# Patient Record
Sex: MALE | Race: BLACK OR AFRICAN AMERICAN | NOT HISPANIC OR LATINO | Employment: OTHER | URBAN - NONMETROPOLITAN AREA
[De-identification: names, ages, dates, MRNs, and addresses within clinical notes are randomized per-mention and may not be internally consistent; named-entity substitution may affect disease eponyms.]

---

## 2022-02-01 ENCOUNTER — HOSPITAL ENCOUNTER (EMERGENCY)
Facility: HOSPITAL | Age: 82
Discharge: HOME | End: 2022-02-01
Payer: COMMERCIAL

## 2022-02-01 VITALS
TEMPERATURE: 98 F | RESPIRATION RATE: 16 BRPM | HEIGHT: 71 IN | HEART RATE: 53 BPM | SYSTOLIC BLOOD PRESSURE: 130 MMHG | DIASTOLIC BLOOD PRESSURE: 70 MMHG | OXYGEN SATURATION: 96 % | WEIGHT: 195 LBS | BODY MASS INDEX: 27.3 KG/M2

## 2022-02-01 DIAGNOSIS — R10.9 FLANK PAIN: ICD-10-CM

## 2022-02-01 DIAGNOSIS — R10.31 RIGHT LOWER QUADRANT ABDOMINAL PAIN: Primary | ICD-10-CM

## 2022-02-01 LAB
ALBUMIN SERPL BCP-MCNC: 3.3 G/DL (ref 3.5–5)
ALBUMIN/GLOB SERPL: 0.8 {RATIO}
ALP SERPL-CCNC: 86 U/L (ref 45–115)
ALT SERPL W P-5'-P-CCNC: 29 U/L (ref 16–61)
ANION GAP SERPL CALCULATED.3IONS-SCNC: 12 MMOL/L (ref 7–16)
AST SERPL W P-5'-P-CCNC: 13 U/L (ref 15–37)
BASOPHILS # BLD AUTO: 0.03 K/UL (ref 0–0.2)
BASOPHILS NFR BLD AUTO: 0.5 % (ref 0–1)
BILIRUB SERPL-MCNC: 0.7 MG/DL (ref 0–1.2)
BILIRUB UR QL STRIP: NEGATIVE
BUN SERPL-MCNC: 10 MG/DL (ref 7–18)
BUN/CREAT SERPL: 9 (ref 6–20)
CALCIUM SERPL-MCNC: 9 MG/DL (ref 8.5–10.1)
CHLORIDE SERPL-SCNC: 101 MMOL/L (ref 98–107)
CLARITY UR: CLEAR
CO2 SERPL-SCNC: 28 MMOL/L (ref 21–32)
COLOR UR: YELLOW
CREAT SERPL-MCNC: 1.15 MG/DL (ref 0.7–1.3)
DIFFERENTIAL METHOD BLD: ABNORMAL
EOSINOPHIL # BLD AUTO: 0.39 K/UL (ref 0–0.5)
EOSINOPHIL NFR BLD AUTO: 6.1 % (ref 1–4)
ERYTHROCYTE [DISTWIDTH] IN BLOOD BY AUTOMATED COUNT: 13.7 % (ref 11.5–14.5)
GLOBULIN SER-MCNC: 4.2 G/DL (ref 2–4)
GLUCOSE SERPL-MCNC: 128 MG/DL (ref 74–106)
GLUCOSE UR STRIP-MCNC: NEGATIVE MG/DL
HCT VFR BLD AUTO: 38.1 % (ref 40–54)
HGB BLD-MCNC: 13.2 G/DL (ref 13.5–18)
KETONES UR STRIP-SCNC: NEGATIVE MG/DL
LEUKOCYTE ESTERASE UR QL STRIP: NEGATIVE
LYMPHOCYTES # BLD AUTO: 2.42 K/UL (ref 1–4.8)
LYMPHOCYTES NFR BLD AUTO: 37.8 % (ref 27–41)
MCH RBC QN AUTO: 28.3 PG (ref 27–31)
MCHC RBC AUTO-ENTMCNC: 34.6 G/DL (ref 32–36)
MCV RBC AUTO: 81.8 FL (ref 80–96)
MONOCYTES # BLD AUTO: 0.72 K/UL (ref 0–0.8)
MONOCYTES NFR BLD AUTO: 11.2 % (ref 2–6)
MPC BLD CALC-MCNC: 10.1 FL (ref 9.4–12.4)
NEUTROPHILS # BLD AUTO: 2.85 K/UL (ref 1.8–7.7)
NEUTROPHILS NFR BLD AUTO: 44.4 % (ref 53–65)
NITRITE UR QL STRIP: NEGATIVE
PH UR STRIP: 6 PH UNITS
PLATELET # BLD AUTO: 231 K/UL (ref 150–400)
POTASSIUM SERPL-SCNC: 4.1 MMOL/L (ref 3.5–5.1)
PROT SERPL-MCNC: 7.5 G/DL (ref 6.4–8.2)
PROT UR QL STRIP: NEGATIVE
RBC # BLD AUTO: 4.66 M/UL (ref 4.6–6.2)
RBC # UR STRIP: NEGATIVE /UL
SODIUM SERPL-SCNC: 137 MMOL/L (ref 136–145)
SP GR UR STRIP: 1.01
UROBILINOGEN UR STRIP-ACNC: 0.2 MG/DL
WBC # BLD AUTO: 6.41 K/UL (ref 4.5–11)

## 2022-02-01 PROCEDURE — 96361 HYDRATE IV INFUSION ADD-ON: CPT

## 2022-02-01 PROCEDURE — 63600175 PHARM REV CODE 636 W HCPCS: Performed by: NURSE PRACTITIONER

## 2022-02-01 PROCEDURE — 99284 EMERGENCY DEPT VISIT MOD MDM: CPT | Mod: 25

## 2022-02-01 PROCEDURE — 25000003 PHARM REV CODE 250: Performed by: NURSE PRACTITIONER

## 2022-02-01 PROCEDURE — 96375 TX/PRO/DX INJ NEW DRUG ADDON: CPT

## 2022-02-01 PROCEDURE — 99284 PR EMERGENCY DEPT VISIT,LEVEL IV: ICD-10-PCS | Mod: ,,, | Performed by: NURSE PRACTITIONER

## 2022-02-01 PROCEDURE — 99284 EMERGENCY DEPT VISIT MOD MDM: CPT | Mod: ,,, | Performed by: NURSE PRACTITIONER

## 2022-02-01 PROCEDURE — 81003 URINALYSIS AUTO W/O SCOPE: CPT | Performed by: NURSE PRACTITIONER

## 2022-02-01 PROCEDURE — 80053 COMPREHEN METABOLIC PANEL: CPT | Performed by: NURSE PRACTITIONER

## 2022-02-01 PROCEDURE — 96374 THER/PROPH/DIAG INJ IV PUSH: CPT

## 2022-02-01 PROCEDURE — 85025 COMPLETE CBC W/AUTO DIFF WBC: CPT | Performed by: NURSE PRACTITIONER

## 2022-02-01 PROCEDURE — 36415 COLL VENOUS BLD VENIPUNCTURE: CPT | Performed by: NURSE PRACTITIONER

## 2022-02-01 RX ORDER — GLIMEPIRIDE 4 MG/1
4 TABLET ORAL
COMMUNITY

## 2022-02-01 RX ORDER — SULFAMETHOXAZOLE AND TRIMETHOPRIM 800; 160 MG/1; MG/1
1 TABLET ORAL 2 TIMES DAILY
COMMUNITY
End: 2023-09-14

## 2022-02-01 RX ORDER — PROMETHAZINE HYDROCHLORIDE 25 MG/1
25 TABLET ORAL EVERY 6 HOURS PRN
Qty: 18 TABLET | Refills: 0 | Status: SHIPPED | OUTPATIENT
Start: 2022-02-01 | End: 2023-07-19 | Stop reason: CLARIF

## 2022-02-01 RX ORDER — ASPIRIN 81 MG/1
81 TABLET ORAL DAILY
COMMUNITY
End: 2023-09-14

## 2022-02-01 RX ORDER — TAMSULOSIN HYDROCHLORIDE 0.4 MG/1
CAPSULE ORAL DAILY
COMMUNITY

## 2022-02-01 RX ORDER — POTASSIUM CHLORIDE 750 MG/1
10 CAPSULE, EXTENDED RELEASE ORAL ONCE
COMMUNITY

## 2022-02-01 RX ORDER — GABAPENTIN 400 MG/1
400 CAPSULE ORAL 2 TIMES DAILY
COMMUNITY

## 2022-02-01 RX ORDER — METFORMIN HYDROCHLORIDE 500 MG/1
500 TABLET ORAL 2 TIMES DAILY WITH MEALS
COMMUNITY

## 2022-02-01 RX ORDER — MELOXICAM 15 MG/1
15 TABLET ORAL DAILY PRN
COMMUNITY
End: 2023-09-14

## 2022-02-01 RX ORDER — AMLODIPINE BESYLATE 10 MG/1
10 TABLET ORAL DAILY
COMMUNITY
End: 2023-09-14

## 2022-02-01 RX ORDER — ATORVASTATIN CALCIUM 40 MG/1
40 TABLET, FILM COATED ORAL DAILY
COMMUNITY

## 2022-02-01 RX ORDER — HYDROMORPHONE HYDROCHLORIDE 2 MG/ML
1 INJECTION, SOLUTION INTRAMUSCULAR; INTRAVENOUS; SUBCUTANEOUS
Status: COMPLETED | OUTPATIENT
Start: 2022-02-01 | End: 2022-02-01

## 2022-02-01 RX ORDER — CARVEDILOL 3.12 MG/1
3.12 TABLET ORAL 2 TIMES DAILY WITH MEALS
COMMUNITY
End: 2023-09-14

## 2022-02-01 RX ORDER — CITALOPRAM 40 MG/1
40 TABLET, FILM COATED ORAL DAILY
COMMUNITY

## 2022-02-01 RX ORDER — OMEPRAZOLE 20 MG/1
20 CAPSULE, DELAYED RELEASE ORAL DAILY
COMMUNITY
End: 2023-09-14

## 2022-02-01 RX ORDER — ONDANSETRON 2 MG/ML
4 INJECTION INTRAMUSCULAR; INTRAVENOUS
Status: COMPLETED | OUTPATIENT
Start: 2022-02-01 | End: 2022-02-01

## 2022-02-01 RX ORDER — HYDROXYZINE HYDROCHLORIDE 25 MG/1
25 TABLET, FILM COATED ORAL NIGHTLY PRN
COMMUNITY
End: 2023-09-14

## 2022-02-01 RX ORDER — HYDROMORPHONE HYDROCHLORIDE 2 MG/1
2 TABLET ORAL EVERY 4 HOURS PRN
Qty: 18 TABLET | Refills: 0 | Status: SHIPPED | OUTPATIENT
Start: 2022-02-01 | End: 2023-07-19 | Stop reason: CLARIF

## 2022-02-01 RX ADMIN — SODIUM CHLORIDE 1000 ML: 9 INJECTION, SOLUTION INTRAVENOUS at 10:02

## 2022-02-01 RX ADMIN — ONDANSETRON 4 MG: 2 INJECTION INTRAMUSCULAR; INTRAVENOUS at 10:02

## 2022-02-01 RX ADMIN — HYDROMORPHONE HYDROCHLORIDE 1 MG: 2 INJECTION, SOLUTION INTRAMUSCULAR; INTRAVENOUS; SUBCUTANEOUS at 10:02

## 2022-02-01 NOTE — DISCHARGE INSTRUCTIONS
Increase fluids by mouth.  Strain all urine.  Call PCP today to set up appointment to follow up from emergency department visit. Needs to follow up for possible renal stone.   Unable to CT scan today due to machine is down.

## 2022-02-01 NOTE — ED NOTES
TRANSFER CANCELED. PT WILL FU WITH PCP AND GET CT OUTPT IF PAIN RETURNS. DC INSTRUCTIONS GIVEN. IV REMOVED. PT WHEELED OUT TO POV IN NAD.

## 2022-02-01 NOTE — ED PROVIDER NOTES
Encounter Date: 2/1/2022       History     Chief Complaint   Patient presents with    Flank Pain     Right side x 2 weeks     Rebel Fisher is a 80 y/o AAM with PMH: HTN, DM and irregular rhythm (anticoagulated with Eliquis) who presents to the ED with complaint of right flank pain that started 2 weeks ago, worse this morning. Reports pain radiates from right flank to right lower abdomen into right testicle. Pain is intermittent, rates at 10 on 1-10 pain scale. Had blood in urine about 1 week ago, seen by PCP and was started on Bactrim DS. He is still taking Bactrim DS. Denies any fever, nausea or vomiting.   Has history of renal stone in the past, but has been years ago. Saw Urologist in the past, but has been years ago and is unable to remember physician's name.           Review of patient's allergies indicates:  No Known Allergies  Past Medical History:   Diagnosis Date    Diabetes mellitus     Hypertension      Past Surgical History:   Procedure Laterality Date    CHOLECYSTECTOMY       History reviewed. No pertinent family history.  Social History     Tobacco Use    Smoking status: Never Smoker    Smokeless tobacco: Current User     Types: Snuff   Substance Use Topics    Alcohol use: Not Currently    Drug use: Never     Review of Systems   Constitutional: Negative for activity change, appetite change, fatigue and fever.   HENT: Negative.    Eyes: Negative.  Negative for photophobia and pain.   Respiratory: Negative.  Negative for cough and shortness of breath.    Cardiovascular: Negative.    Gastrointestinal: Positive for abdominal pain. Negative for blood in stool, constipation, diarrhea, nausea and vomiting.   Genitourinary: Positive for flank pain, frequency and testicular pain. Negative for decreased urine volume, difficulty urinating, dysuria, penile discharge, penile pain and scrotal swelling.        Right flank pain radiates to right lower abdomen into right testicle   Skin: Negative.  Negative  for rash.   Neurological: Negative.  Negative for dizziness and headaches.   Hematological: Negative.    Psychiatric/Behavioral: Negative.        Physical Exam     Initial Vitals [02/01/22 0917]   BP Pulse Resp Temp SpO2   (!) 151/60 62 18 97.8 °F (36.6 °C) 98 %      MAP       --         Physical Exam    Vitals reviewed.  Constitutional: He appears well-developed and well-nourished.   HENT:   Head: Normocephalic.   Eyes: Conjunctivae and EOM are normal. Pupils are equal, round, and reactive to light.   Neck: Neck supple.   Normal range of motion.  Cardiovascular: Normal rate, regular rhythm, normal heart sounds and intact distal pulses.   Pulses:       Radial pulses are 2+ on the right side and 2+ on the left side.        Posterior tibial pulses are 2+ on the right side and 2+ on the left side.   No edema in BLE   Pulmonary/Chest: Effort normal and breath sounds normal.   Abdominal: Abdomen is soft. Bowel sounds are normal. There is abdominal tenderness in the right lower quadrant. No hernia.   Right flank pain   No right CVA tenderness.  No left CVA tenderness. There is no rebound, no guarding, no tenderness at McBurney's point and negative Kessler's sign.   Musculoskeletal:         General: Normal range of motion.      Cervical back: Normal range of motion and neck supple.     Neurological: He is alert and oriented to person, place, and time.   Skin: Skin is warm and dry. Capillary refill takes less than 2 seconds. No rash noted.   Psychiatric: He has a normal mood and affect. His behavior is normal. Judgment and thought content normal.         Medical Screening Exam   See Full Note    ED Course   Procedures  Labs Reviewed   COMPREHENSIVE METABOLIC PANEL - Abnormal; Notable for the following components:       Result Value    Glucose 128 (*)     Albumin 3.3 (*)     Globulin 4.2 (*)     AST 13 (*)     All other components within normal limits   CBC WITH DIFFERENTIAL - Abnormal; Notable for the following components:     Hemoglobin 13.2 (*)     Hematocrit 38.1 (*)     Neutrophils % 44.4 (*)     Monocytes % 11.2 (*)     Eosinophils % 6.1 (*)     All other components within normal limits   URINALYSIS, REFLEX TO URINE CULTURE - Normal   CBC W/ AUTO DIFFERENTIAL    Narrative:     The following orders were created for panel order CBC auto differential.  Procedure                               Abnormality         Status                     ---------                               -----------         ------                     CBC with Differential[[id number]]        Abnormal            Final result                 Please view results for these tests on the individual orders.          Imaging Results          X-Ray Abdomen AP 1 View (KUB) (Final result)  Result time 02/01/22 10:16:47    Final result by Nael Shay DO (02/01/22 10:16:47)                 Impression:      No convincing acute findings.  Suspect hiatal hernia.  Prior cholecystectomy.    Point of Service: [facility]      Electronically signed by: Nael Shay  Date:    02/01/2022  Time:    10:16             Narrative:    EXAMINATION:  XR ABDOMEN AP 1 VIEW    CLINICAL HISTORY:  Unspecified abdominal pain    COMPARISON:  None    TECHNIQUE:  Frontal views of the abdomen.    FINDINGS:  Nonspecific bowel gas pattern.  Suspect hiatal hernia.  Surgical clips project over the right upper quadrant of the abdomen.  Visualized osseous and surrounding soft tissue structures demonstrate no acute abnormality.                                 Medications   HYDROmorphone (PF) injection 1 mg (1 mg Intravenous Given 2/1/22 1000)   ondansetron injection 4 mg (4 mg Intravenous Given 2/1/22 1000)   sodium chloride 0.9% bolus 1,000 mL (0 mLs Intravenous Stopped 2/1/22 1130)     Medical Decision Making:   ED Management:  11:07  Pain resolved.  11:54 Most likely symptoms are related to renal calculi, but KUB did not show a stone. Unable to get CT scan here at [facility] due to CT machine  is down.   Other:   I have discussed this case with another health care provider.       <> Summary of the Discussion: 11:54 Discussed patient's case with Dr. Homer Tovar. Patient's abdomen soft, no abdominal pain at present. No abdominal bruits noted. Due to history of renal stones will treat as renal stone based up on patient's symptoms.  Patient to strain urine, prescription for pain medication given, has Flomax prescription (home medications) strain all urine and he will follow up with PCP. If he does not pass a stone or pain becomes worse will need CT scan at that time. Patient agrees to treatment plan and verbalized understanding.                       Clinical Impression:   Final diagnoses:  [R10.9] Flank pain - probable renal stone  [R10.31] Right lower quadrant abdominal pain (Primary)          ED Disposition Condition    Discharge Stable        ED Prescriptions     Medication Sig Dispense Start Date End Date Auth. Provider    HYDROmorphone (DILAUDID) 2 MG tablet Take 1 tablet (2 mg total) by mouth every 4 (four) hours as needed for Pain. 18 tablet 2/1/2022  TYREE Brown    promethazine (PHENERGAN) 25 MG tablet Take 1 tablet (25 mg total) by mouth every 6 (six) hours as needed for Nausea. 18 tablet 2/1/2022  TYREE Brown        Follow-up Information     Follow up With Specialties Details Why Contact Info    Meron Higgins MD Internal Medicine Schedule an appointment as soon as possible for a visit in 1 day Call PCP and schedule follow up appointment. [address]  [facility] [address]  [phone]             TYREE Brown  02/01/22 8915

## 2022-02-02 ENCOUNTER — TELEPHONE (OUTPATIENT)
Dept: EMERGENCY MEDICINE | Facility: HOSPITAL | Age: 82
End: 2022-02-02
Payer: COMMERCIAL

## 2023-07-19 ENCOUNTER — HOSPITAL ENCOUNTER (EMERGENCY)
Facility: HOSPITAL | Age: 83
Discharge: HOME | End: 2023-07-19
Payer: MEDICARE

## 2023-07-19 VITALS
RESPIRATION RATE: 18 BRPM | DIASTOLIC BLOOD PRESSURE: 51 MMHG | WEIGHT: 200 LBS | TEMPERATURE: 99 F | HEART RATE: 55 BPM | HEIGHT: 71 IN | BODY MASS INDEX: 28 KG/M2 | OXYGEN SATURATION: 97 % | SYSTOLIC BLOOD PRESSURE: 148 MMHG

## 2023-07-19 DIAGNOSIS — E86.0 DEHYDRATION: ICD-10-CM

## 2023-07-19 DIAGNOSIS — R11.2 NAUSEA AND VOMITING, UNSPECIFIED VOMITING TYPE: Primary | ICD-10-CM

## 2023-07-19 LAB
ALBUMIN SERPL BCP-MCNC: 3.6 G/DL (ref 3.5–5)
ALBUMIN/GLOB SERPL: 0.8 {RATIO}
ALP SERPL-CCNC: 93 U/L (ref 45–115)
ALT SERPL W P-5'-P-CCNC: 20 U/L (ref 16–61)
ANION GAP SERPL CALCULATED.3IONS-SCNC: 9 MMOL/L (ref 7–16)
AST SERPL W P-5'-P-CCNC: 10 U/L (ref 15–37)
BACTERIA #/AREA URNS HPF: NORMAL /HPF
BASOPHILS # BLD AUTO: 0.03 K/UL (ref 0–0.2)
BASOPHILS NFR BLD AUTO: 0.5 % (ref 0–1)
BILIRUB SERPL-MCNC: 0.9 MG/DL (ref ?–1.2)
BILIRUB UR QL STRIP: NEGATIVE
BUN SERPL-MCNC: 12 MG/DL (ref 7–18)
BUN/CREAT SERPL: 8 (ref 6–20)
CALCIUM SERPL-MCNC: 9.7 MG/DL (ref 8.5–10.1)
CHLORIDE SERPL-SCNC: 97 MMOL/L (ref 98–107)
CLARITY UR: CLEAR
CO2 SERPL-SCNC: 31 MMOL/L (ref 21–32)
COLOR UR: ABNORMAL
CREAT SERPL-MCNC: 1.49 MG/DL (ref 0.7–1.3)
DIFFERENTIAL METHOD BLD: ABNORMAL
EGFR (NO RACE VARIABLE) (RUSH/TITUS): 47 ML/MIN/1.73M2
EOSINOPHIL # BLD AUTO: 0.09 K/UL (ref 0–0.5)
EOSINOPHIL NFR BLD AUTO: 1.4 % (ref 1–4)
ERYTHROCYTE [DISTWIDTH] IN BLOOD BY AUTOMATED COUNT: 13.1 % (ref 11.5–14.5)
GLOBULIN SER-MCNC: 4.6 G/DL (ref 2–4)
GLUCOSE SERPL-MCNC: 250 MG/DL (ref 74–106)
GLUCOSE UR STRIP-MCNC: 500 MG/DL
HCT VFR BLD AUTO: 39 % (ref 40–54)
HGB BLD-MCNC: 12.7 G/DL (ref 13.5–18)
IMM GRANULOCYTES # BLD AUTO: 0.02 K/UL (ref 0–0.04)
IMM GRANULOCYTES NFR BLD: 0.3 % (ref 0–0.4)
KETONES UR STRIP-SCNC: NEGATIVE MG/DL
LEUKOCYTE ESTERASE UR QL STRIP: NEGATIVE
LIPASE SERPL-CCNC: 155 U/L (ref 73–393)
LYMPHOCYTES # BLD AUTO: 2.15 K/UL (ref 1–4.8)
LYMPHOCYTES NFR BLD AUTO: 33.8 % (ref 27–41)
MCH RBC QN AUTO: 27.7 PG (ref 27–31)
MCHC RBC AUTO-ENTMCNC: 32.6 G/DL (ref 32–36)
MCV RBC AUTO: 85.2 FL (ref 80–96)
MONOCYTES # BLD AUTO: 0.52 K/UL (ref 0–0.8)
MONOCYTES NFR BLD AUTO: 8.2 % (ref 2–6)
MPC BLD CALC-MCNC: 9.7 FL (ref 9.4–12.4)
MUCOUS THREADS #/AREA URNS HPF: NORMAL /HPF
NEUTROPHILS # BLD AUTO: 3.55 K/UL (ref 1.8–7.7)
NEUTROPHILS NFR BLD AUTO: 55.8 % (ref 53–65)
NITRITE UR QL STRIP: NEGATIVE
NRBC # BLD AUTO: 0 X10E3/UL
NRBC, AUTO (.00): 0 %
PH UR STRIP: 6.5 PH UNITS
PLATELET # BLD AUTO: 310 K/UL (ref 150–400)
POTASSIUM SERPL-SCNC: 4.4 MMOL/L (ref 3.5–5.1)
PROT SERPL-MCNC: 8.2 G/DL (ref 6.4–8.2)
PROT UR QL STRIP: 30
RBC # BLD AUTO: 4.58 M/UL (ref 4.6–6.2)
RBC # UR STRIP: NEGATIVE /UL
RBC #/AREA URNS HPF: NORMAL /HPF
SODIUM SERPL-SCNC: 133 MMOL/L (ref 136–145)
SP GR UR STRIP: 1.02
SQUAMOUS #/AREA URNS LPF: NORMAL /LPF
TRICHOMONAS #/AREA URNS HPF: NORMAL /HPF
UROBILINOGEN UR STRIP-ACNC: NORMAL MG/DL
WBC # BLD AUTO: 6.36 K/UL (ref 4.5–11)
WBC #/AREA URNS HPF: NORMAL /HPF
YEAST #/AREA URNS HPF: NORMAL /HPF

## 2023-07-19 PROCEDURE — 99284 EMERGENCY DEPT VISIT MOD MDM: CPT | Mod: ,,, | Performed by: NURSE PRACTITIONER

## 2023-07-19 PROCEDURE — 81001 URINALYSIS AUTO W/SCOPE: CPT | Performed by: NURSE PRACTITIONER

## 2023-07-19 PROCEDURE — 83690 ASSAY OF LIPASE: CPT | Performed by: NURSE PRACTITIONER

## 2023-07-19 PROCEDURE — 85025 COMPLETE CBC W/AUTO DIFF WBC: CPT | Performed by: NURSE PRACTITIONER

## 2023-07-19 PROCEDURE — 96361 HYDRATE IV INFUSION ADD-ON: CPT

## 2023-07-19 PROCEDURE — 99284 PR EMERGENCY DEPT VISIT,LEVEL IV: ICD-10-PCS | Mod: ,,, | Performed by: NURSE PRACTITIONER

## 2023-07-19 PROCEDURE — 99284 EMERGENCY DEPT VISIT MOD MDM: CPT | Mod: 25

## 2023-07-19 PROCEDURE — 63600175 PHARM REV CODE 636 W HCPCS: Performed by: NURSE PRACTITIONER

## 2023-07-19 PROCEDURE — 80053 COMPREHEN METABOLIC PANEL: CPT | Performed by: NURSE PRACTITIONER

## 2023-07-19 PROCEDURE — 25000003 PHARM REV CODE 250: Performed by: NURSE PRACTITIONER

## 2023-07-19 PROCEDURE — 96374 THER/PROPH/DIAG INJ IV PUSH: CPT

## 2023-07-19 RX ORDER — ONDANSETRON 4 MG/1
4 TABLET, FILM COATED ORAL EVERY 6 HOURS
Qty: 12 TABLET | Refills: 0 | Status: SHIPPED | OUTPATIENT
Start: 2023-07-19

## 2023-07-19 RX ORDER — SODIUM CHLORIDE 9 MG/ML
1000 INJECTION, SOLUTION INTRAVENOUS
Status: COMPLETED | OUTPATIENT
Start: 2023-07-19 | End: 2023-07-19

## 2023-07-19 RX ORDER — ONDANSETRON 2 MG/ML
4 INJECTION INTRAMUSCULAR; INTRAVENOUS
Status: COMPLETED | OUTPATIENT
Start: 2023-07-19 | End: 2023-07-19

## 2023-07-19 RX ADMIN — SODIUM CHLORIDE 1000 ML: 9 INJECTION, SOLUTION INTRAVENOUS at 09:07

## 2023-07-19 RX ADMIN — ONDANSETRON 4 MG: 2 INJECTION INTRAMUSCULAR; INTRAVENOUS at 09:07

## 2023-07-20 NOTE — ED PROVIDER NOTES
Encounter Date: 7/19/2023       History     Chief Complaint   Patient presents with    Abdominal Pain    Nausea    Vomiting    Diarrhea     83 year old male presents to ED with complaint of nausea, vomiting, diarrhea, and abdominal pain. Spouse reports patient started having URI symptoms approximately 3 days ago and was scheduled to have prostate surgery that was canceled due to URI symptoms. Patient was placed on antibiotic and spouse states patient has been taking antibiotic without eating/drinking. Patient developed nausea/vomiting, diarrhea, abdominal pain. Denies chest pain, shortness of breath, weakness, dizziness.     The history is provided by the patient, medical records and a relative.   Review of patient's allergies indicates:  No Known Allergies  Past Medical History:   Diagnosis Date    Diabetes mellitus     Hypertension      Past Surgical History:   Procedure Laterality Date    CHOLECYSTECTOMY       History reviewed. No pertinent family history.  Social History     Tobacco Use    Smoking status: Never    Smokeless tobacco: Current     Types: Snuff   Substance Use Topics    Alcohol use: Not Currently    Drug use: Never     Review of Systems   Constitutional:  Negative for chills and fever.   HENT:  Negative for sinus pressure and sinus pain.    Eyes:  Negative for photophobia and visual disturbance.   Respiratory:  Negative for cough and shortness of breath.    Cardiovascular:  Negative for chest pain and palpitations.   Gastrointestinal:  Positive for abdominal pain, diarrhea, nausea and vomiting.   Endocrine: Negative for cold intolerance and heat intolerance.   Genitourinary:  Negative for difficulty urinating and dysuria.   Musculoskeletal:  Positive for myalgias. Negative for arthralgias and gait problem.   Skin:  Negative for color change and wound.   Allergic/Immunologic: Negative for environmental allergies and food allergies.   Neurological:  Negative for dizziness and weakness.    Hematological:  Negative for adenopathy. Does not bruise/bleed easily.   Psychiatric/Behavioral:  Negative for agitation and confusion.    All other systems reviewed and are negative.    Physical Exam     Initial Vitals [07/19/23 1809]   BP Pulse Resp Temp SpO2   (!) 148/51 (!) 55 18 98.5 °F (36.9 °C) 97 %      MAP       --         Physical Exam    Nursing note and vitals reviewed.  Constitutional: He appears well-developed and well-nourished.   HENT:   Head: Normocephalic and atraumatic.   Eyes: EOM are normal. Pupils are equal, round, and reactive to light.   Neck: Neck supple.   Normal range of motion.  Cardiovascular:  Normal rate and regular rhythm.           Pulmonary/Chest: He has no wheezes. He has no rhonchi.   Abdominal: Abdomen is soft. He exhibits no distension. There is no abdominal tenderness.   Musculoskeletal:         General: No tenderness or edema.      Cervical back: Normal range of motion and neck supple.     Lymphadenopathy:     He has no cervical adenopathy.   Neurological: He is alert and oriented to person, place, and time. No cranial nerve deficit or sensory deficit.   Skin: Skin is warm and dry. Capillary refill takes less than 2 seconds.   Psychiatric: He has a normal mood and affect. Thought content normal.       Medical Screening Exam   See Full Note    ED Course   Procedures  Labs Reviewed   COMPREHENSIVE METABOLIC PANEL - Abnormal; Notable for the following components:       Result Value    Sodium 133 (*)     Chloride 97 (*)     Glucose 250 (*)     Creatinine 1.49 (*)     Globulin 4.6 (*)     AST 10 (*)     eGFR 47 (*)     All other components within normal limits   URINALYSIS, REFLEX TO URINE CULTURE - Abnormal; Notable for the following components:    Protein, UA 30 (*)     Glucose,  (*)     All other components within normal limits   CBC WITH DIFFERENTIAL - Abnormal; Notable for the following components:    RBC 4.58 (*)     Hemoglobin 12.7 (*)     Hematocrit 39.0 (*)      Monocytes % 8.2 (*)     All other components within normal limits   LIPASE - Normal   URINALYSIS, MICROSCOPIC - Normal   CBC W/ AUTO DIFFERENTIAL    Narrative:     The following orders were created for panel order CBC W/ AUTO DIFFERENTIAL.  Procedure                               Abnormality         Status                     ---------                               -----------         ------                     CBC with Differential[[id number]]        Abnormal            Final result                 Please view results for these tests on the individual orders.          Imaging Results              X-Ray Abdomen Flat And Erect (Final result)  Result time 07/19/23 21:02:38      Final result by Lonnie Dawkins MD (07/19/23 21:02:38)                   Impression:      No acute radiographic abnormality in the abdomen.    A mild degree of fecal stasis/constipation is suspected.    Place of service: [facility]      Electronically signed by: Lonnie Dawkins  Date:    07/19/2023  Time:    21:02               Narrative:    EXAMINATION:  XR ABDOMEN FLAT AND ERECT    CLINICAL HISTORY:  Abdominal Pain;    TECHNIQUE:  Abdomen flat and erect    COMPARISON:  Prior radiograph February 1, 2022    FINDINGS:  Bowel gas pattern is nonspecific and within normal limits. No abnormally dilated small bowel loops to suggest obstruction. There is no free air within the abdomen. There is a moderate amount of stool seen throughout the colon.  Cholecystectomy clips noted right upper quadrant    No abnormal calcific densities project within the abdomen.    Lung bases are predominantly clear. There is no acute osseous abnormality.                                       Medications   0.9%  NaCl infusion (0 mLs Intravenous Stopped 7/19/23 2300)   ondansetron injection 4 mg (4 mg Intravenous Given 7/19/23 2144)     Medical Decision Making:   Initial Assessment:   Abdominal pain  Nausea/vomiting  diarrhea  Differential Diagnosis:   Viral  syndrome   URI  Clinical Tests:   Lab Tests: Ordered and Reviewed  Radiological Study: Ordered and Reviewed  ED Management:  MDM    Patient presents for emergent evaluation of acute abdominal pain, nausea, vomiting, diarrhea that poses a threat to life and/or bodily function.    In the ED patient found to have acute nausea and vomiting, dehydration.    I ordered labs and personally reviewed them.  Labs significant for sodium 133, chloride 97, glucose 250, creat 1.49, protein 30, glucose 500  I ordered X-rays and personally reviewed them and reviewed the radiologist interpretation.  Xray significant for mild degree of fecal stasis/constipation, no acute radiographic abnormalities.      Discharge MDM  Patient was managed in the ED with IV normal saline, Zofran.    The response to treatment was  good.    Patient was discharged in stable condition.  Detailed return precautions discussed.                         Clinical Impression:   Final diagnoses:  [R11.2] Nausea and vomiting, unspecified vomiting type (Primary)  [E86.0] Dehydration        ED Disposition Condition    Discharge Stable          ED Prescriptions       Medication Sig Dispense Start Date End Date Auth. Provider    ondansetron (ZOFRAN) 4 MG tablet Take 1 tablet (4 mg total) by mouth every 6 (six) hours. 12 tablet 7/19/2023 -- TYREE Villalpando          Follow-up Information    None          TYREE Villalpando  07/19/23 4238

## 2023-09-14 ENCOUNTER — HOSPITAL ENCOUNTER (EMERGENCY)
Facility: HOSPITAL | Age: 83
Discharge: HOME | End: 2023-09-14
Payer: MEDICARE

## 2023-09-14 VITALS
SYSTOLIC BLOOD PRESSURE: 180 MMHG | WEIGHT: 186 LBS | HEIGHT: 71 IN | RESPIRATION RATE: 18 BRPM | HEART RATE: 51 BPM | BODY MASS INDEX: 26.04 KG/M2 | DIASTOLIC BLOOD PRESSURE: 90 MMHG | TEMPERATURE: 99 F | OXYGEN SATURATION: 95 %

## 2023-09-14 DIAGNOSIS — N39.0 URINARY TRACT INFECTION WITHOUT HEMATURIA, SITE UNSPECIFIED: Primary | ICD-10-CM

## 2023-09-14 DIAGNOSIS — R11.2 NAUSEA AND VOMITING, UNSPECIFIED VOMITING TYPE: ICD-10-CM

## 2023-09-14 LAB
ALBUMIN SERPL BCP-MCNC: 3.2 G/DL (ref 3.5–5)
ALBUMIN/GLOB SERPL: 0.7 {RATIO}
ALP SERPL-CCNC: 108 U/L (ref 45–115)
ALT SERPL W P-5'-P-CCNC: 12 U/L (ref 16–61)
ANION GAP SERPL CALCULATED.3IONS-SCNC: 10 MMOL/L (ref 7–16)
AST SERPL W P-5'-P-CCNC: 17 U/L (ref 15–37)
BACTERIA #/AREA URNS HPF: ABNORMAL /HPF
BASOPHILS # BLD AUTO: 0.02 K/UL (ref 0–0.2)
BASOPHILS NFR BLD AUTO: 0.3 % (ref 0–1)
BILIRUB SERPL-MCNC: 1.6 MG/DL (ref ?–1.2)
BILIRUB UR QL STRIP: NEGATIVE
BUN SERPL-MCNC: 13 MG/DL (ref 7–18)
BUN/CREAT SERPL: 11 (ref 6–20)
CALCIUM SERPL-MCNC: 8.8 MG/DL (ref 8.5–10.1)
CHLORIDE SERPL-SCNC: 100 MMOL/L (ref 98–107)
CLARITY UR: CLEAR
CO2 SERPL-SCNC: 33 MMOL/L (ref 21–32)
COLOR UR: YELLOW
CREAT SERPL-MCNC: 1.16 MG/DL (ref 0.7–1.3)
DIFFERENTIAL METHOD BLD: ABNORMAL
EGFR (NO RACE VARIABLE) (RUSH/TITUS): 63 ML/MIN/1.73M2
EOSINOPHIL # BLD AUTO: 0.08 K/UL (ref 0–0.5)
EOSINOPHIL NFR BLD AUTO: 1.1 % (ref 1–4)
ERYTHROCYTE [DISTWIDTH] IN BLOOD BY AUTOMATED COUNT: 13.4 % (ref 11.5–14.5)
GLOBULIN SER-MCNC: 4.4 G/DL (ref 2–4)
GLUCOSE SERPL-MCNC: 248 MG/DL (ref 74–106)
GLUCOSE UR STRIP-MCNC: >=1000 MG/DL
HCT VFR BLD AUTO: 38.8 % (ref 40–54)
HGB BLD-MCNC: 12.5 G/DL (ref 13.5–18)
KETONES UR STRIP-SCNC: ABNORMAL MG/DL
LACTATE SERPL-SCNC: 1.1 MMOL/L (ref 0.4–2)
LEUKOCYTE ESTERASE UR QL STRIP: ABNORMAL
LIPASE SERPL-CCNC: 43 U/L (ref 73–393)
LYMPHOCYTES # BLD AUTO: 1.29 K/UL (ref 1–4.8)
LYMPHOCYTES NFR BLD AUTO: 18 % (ref 27–41)
MCH RBC QN AUTO: 28 PG (ref 27–31)
MCHC RBC AUTO-ENTMCNC: 32.2 G/DL (ref 32–36)
MCV RBC AUTO: 86.8 FL (ref 80–96)
MONOCYTES # BLD AUTO: 0.5 K/UL (ref 0–0.8)
MONOCYTES NFR BLD AUTO: 7 % (ref 2–6)
MPC BLD CALC-MCNC: 10.3 FL (ref 9.4–12.4)
NEUTROPHILS # BLD AUTO: 5.29 K/UL (ref 1.8–7.7)
NEUTROPHILS NFR BLD AUTO: 73.6 % (ref 53–65)
NITRITE UR QL STRIP: POSITIVE
PH UR STRIP: 5.5 PH UNITS
PLATELET # BLD AUTO: 289 K/UL (ref 150–400)
POTASSIUM SERPL-SCNC: 3.6 MMOL/L (ref 3.5–5.1)
PROT SERPL-MCNC: 7.6 G/DL (ref 6.4–8.2)
PROT UR QL STRIP: 100
RBC # BLD AUTO: 4.47 M/UL (ref 4.6–6.2)
RBC # UR STRIP: ABNORMAL /UL
RBC #/AREA URNS HPF: ABNORMAL /HPF
SODIUM SERPL-SCNC: 139 MMOL/L (ref 136–145)
SP GR UR STRIP: >=1.03
SQUAMOUS #/AREA URNS LPF: ABNORMAL /LPF
UROBILINOGEN UR STRIP-ACNC: 0.2 MG/DL
WBC # BLD AUTO: 7.18 K/UL (ref 4.5–11)
WBC #/AREA URNS HPF: ABNORMAL /HPF

## 2023-09-14 PROCEDURE — 85025 COMPLETE CBC W/AUTO DIFF WBC: CPT | Performed by: NURSE PRACTITIONER

## 2023-09-14 PROCEDURE — 96375 TX/PRO/DX INJ NEW DRUG ADDON: CPT

## 2023-09-14 PROCEDURE — 83690 ASSAY OF LIPASE: CPT | Performed by: NURSE PRACTITIONER

## 2023-09-14 PROCEDURE — 80053 COMPREHEN METABOLIC PANEL: CPT | Performed by: NURSE PRACTITIONER

## 2023-09-14 PROCEDURE — 99284 EMERGENCY DEPT VISIT MOD MDM: CPT | Mod: 25

## 2023-09-14 PROCEDURE — 63600175 PHARM REV CODE 636 W HCPCS: Performed by: NURSE PRACTITIONER

## 2023-09-14 PROCEDURE — 99284 EMERGENCY DEPT VISIT MOD MDM: CPT | Mod: GF | Performed by: NURSE PRACTITIONER

## 2023-09-14 PROCEDURE — 25000003 PHARM REV CODE 250: Performed by: NURSE PRACTITIONER

## 2023-09-14 PROCEDURE — 87186 SC STD MICRODIL/AGAR DIL: CPT | Performed by: NURSE PRACTITIONER

## 2023-09-14 PROCEDURE — 83605 ASSAY OF LACTIC ACID: CPT | Performed by: NURSE PRACTITIONER

## 2023-09-14 PROCEDURE — 96365 THER/PROPH/DIAG IV INF INIT: CPT

## 2023-09-14 PROCEDURE — 96361 HYDRATE IV INFUSION ADD-ON: CPT

## 2023-09-14 PROCEDURE — 87077 CULTURE AEROBIC IDENTIFY: CPT | Performed by: NURSE PRACTITIONER

## 2023-09-14 PROCEDURE — 81001 URINALYSIS AUTO W/SCOPE: CPT | Performed by: NURSE PRACTITIONER

## 2023-09-14 RX ORDER — LEVOCETIRIZINE DIHYDROCHLORIDE 5 MG/1
TABLET, FILM COATED ORAL
COMMUNITY

## 2023-09-14 RX ORDER — SULFAMETHOXAZOLE AND TRIMETHOPRIM 800; 160 MG/1; MG/1
1 TABLET ORAL 2 TIMES DAILY
Qty: 20 TABLET | Refills: 0 | Status: ON HOLD | OUTPATIENT
Start: 2023-09-14 | End: 2023-09-18 | Stop reason: HOSPADM

## 2023-09-14 RX ORDER — DIPHENOXYLATE HYDROCHLORIDE AND ATROPINE SULFATE 2.5; .025 MG/1; MG/1
1 TABLET ORAL 4 TIMES DAILY PRN
Status: ON HOLD | COMMUNITY
End: 2023-09-18 | Stop reason: HOSPADM

## 2023-09-14 RX ORDER — PANTOPRAZOLE SODIUM 40 MG/1
TABLET, DELAYED RELEASE ORAL
COMMUNITY

## 2023-09-14 RX ORDER — DONEPEZIL HYDROCHLORIDE 10 MG/1
10 TABLET, FILM COATED ORAL
COMMUNITY
Start: 2023-07-18

## 2023-09-14 RX ORDER — ETODOLAC 400 MG/1
400 TABLET, FILM COATED ORAL 2 TIMES DAILY PRN
COMMUNITY
Start: 2023-05-23

## 2023-09-14 RX ORDER — IRBESARTAN 150 MG/1
150 TABLET ORAL NIGHTLY
COMMUNITY

## 2023-09-14 RX ORDER — ONDANSETRON 2 MG/ML
4 INJECTION INTRAMUSCULAR; INTRAVENOUS
Status: COMPLETED | OUTPATIENT
Start: 2023-09-14 | End: 2023-09-14

## 2023-09-14 RX ORDER — TRAZODONE HYDROCHLORIDE 50 MG/1
50 TABLET ORAL NIGHTLY
COMMUNITY
Start: 2023-07-02

## 2023-09-14 RX ADMIN — ONDANSETRON 4 MG: 2 INJECTION INTRAMUSCULAR; INTRAVENOUS at 01:09

## 2023-09-14 RX ADMIN — SODIUM CHLORIDE 1000 ML: 9 INJECTION, SOLUTION INTRAVENOUS at 01:09

## 2023-09-14 RX ADMIN — CEFTRIAXONE 1 G: 1 INJECTION, POWDER, FOR SOLUTION INTRAMUSCULAR; INTRAVENOUS at 03:09

## 2023-09-14 NOTE — ED PROVIDER NOTES
"Encounter Date: 9/14/2023       History     Chief Complaint   Patient presents with    Vomiting     84 y/o AAM is brought to the ED by his neighbor with complaint of nausea, vomiting,  diarrhea and abdominal pain that started on Sunday evening. Reports has felt feverish at times, but has not checked temperature. Was seen by provider on Tuesday at [place] for symptoms, was started on medication for nausea and vomiting. Abdominal pain and diarrhea has resolved, but continues to have nausea with vomiting. Has not taken any medication for his symptoms today. Sipped on Sprite on the way to the ED has kept it down. Last meal was last night.   He was scheduled for pacemaker at [facility] today, but "was too sick to go".  PSHx: Part of liver removed (patient unsure why) and cholecystectomy    The history is provided by the patient.     Review of patient's allergies indicates:  No Known Allergies  Past Medical History:   Diagnosis Date    Diabetes mellitus     Hypertension      Past Surgical History:   Procedure Laterality Date    CHOLECYSTECTOMY       No family history on file.  Social History     Tobacco Use    Smoking status: Never    Smokeless tobacco: Current     Types: Snuff   Substance Use Topics    Alcohol use: Not Currently    Drug use: Never     Review of Systems   Constitutional:  Positive for activity change, appetite change, chills and fatigue. Negative for fever.   HENT: Negative.     Eyes: Negative.    Respiratory: Negative.     Cardiovascular: Negative.    Gastrointestinal:  Positive for nausea and vomiting. Negative for abdominal pain, constipation and diarrhea.   Genitourinary: Negative.    Musculoskeletal: Negative.    Skin: Negative.    Neurological: Negative.    Hematological: Negative.    Psychiatric/Behavioral: Negative.         Physical Exam     Initial Vitals [09/14/23 1240]   BP Pulse Resp Temp SpO2   (!) 178/74 (!) 51 16 98.7 °F (37.1 °C) 97 %      MAP       --         Physical " Exam    Nursing note and vitals reviewed.  Constitutional: He appears well-developed and well-nourished. He is cooperative. He does not have a sickly appearance. He does not appear ill. No distress.   HENT:   Head: Normocephalic and atraumatic.   Right Ear: External ear normal.   Left Ear: External ear normal.   Nose: Nose normal.   Mouth/Throat: Mucous membranes are dry.   Eyes: Conjunctivae are normal. Pupils are equal, round, and reactive to light.   Neck: Neck supple.   Normal range of motion.  Cardiovascular:  Regular rhythm, normal heart sounds and intact distal pulses.   Bradycardia present.         No edema to BLE   Pulmonary/Chest: Effort normal and breath sounds normal.   Abdominal: Abdomen is soft. Bowel sounds are normal. There is no abdominal tenderness.   Musculoskeletal:         General: Normal range of motion.      Cervical back: Normal range of motion and neck supple.     Lymphadenopathy:     He has no cervical adenopathy.   Neurological: He is alert and oriented to person, place, and time. No cranial nerve deficit. GCS eye subscore is 4. GCS verbal subscore is 5. GCS motor subscore is 6.   Skin: Skin is warm and dry. Capillary refill takes less than 2 seconds.   Psychiatric: He has a normal mood and affect. His speech is normal and behavior is normal. Thought content normal.         Medical Screening Exam   See Full Note    ED Course   Procedures  Labs Reviewed   COMPREHENSIVE METABOLIC PANEL - Abnormal; Notable for the following components:       Result Value    CO2 33 (*)     Glucose 248 (*)     Albumin 3.2 (*)     Globulin 4.4 (*)     Bilirubin, Total 1.6 (*)     ALT 12 (*)     All other components within normal limits   URINALYSIS, REFLEX TO URINE CULTURE - Abnormal; Notable for the following components:    Leukocytes, UA Trace (*)     Nitrites, UA Positive (*)     Protein,  (*)     Glucose, UA >=1000 (*)     Blood, UA Moderate (*)     Specific Gravity, UA >=1.030 (*)     All other  components within normal limits   LIPASE - Abnormal; Notable for the following components:    Lipase 43 (*)     All other components within normal limits   CBC WITH DIFFERENTIAL - Abnormal; Notable for the following components:    RBC 4.47 (*)     Hemoglobin 12.5 (*)     Hematocrit 38.8 (*)     Neutrophils % 73.6 (*)     Lymphocytes % 18.0 (*)     Monocytes % 7.0 (*)     All other components within normal limits   URINALYSIS, MICROSCOPIC - Abnormal; Notable for the following components:    WBC, UA 11-15 (*)     RBC, UA 5-10 (*)     Bacteria, UA Moderate (*)     All other components within normal limits   LACTIC ACID, PLASMA - Normal   CULTURE, URINE   CBC W/ AUTO DIFFERENTIAL    Narrative:     The following orders were created for panel order CBC auto differential.  Procedure                               Abnormality         Status                     ---------                               -----------         ------                     CBC with Differential[[id number]]        Abnormal            Final result                 Please view results for these tests on the individual orders.          Imaging Results    None          Medications   cefTRIAXone (ROCEPHIN) 1 g in dextrose 5 % in water (D5W) 100 mL IVPB (MB+) (has no administration in time range)   sodium chloride 0.9% bolus 1,000 mL 1,000 mL (1,000 mLs Intravenous New Bag 9/14/23 1349)   ondansetron injection 4 mg (4 mg Intravenous Given 9/14/23 1349)     Medical Decision Making  BP elevated with bradycardia. Oral mucus membranes dry, otherwise HEENT wnl. HR 51 regular. Lungs CTA. Abdomen is non-distended. BS + x 4 quads, soft with no tenderness. Appears in NAD.    Amount and/or Complexity of Data Reviewed  Labs: ordered.     Details: CBC: WBC 7.18, H&H 12.5/38.8  CMP: Na 139, K+ 3.6, BUN/CR 13/1.16, Lipase 43  Lactate:1.1  UA: + Nitrite, WBC 11-15, RBC 5-10, Moderate bacteria  Discussion of management or test interpretation with external provider(s):  Discharge MDM  I discussed lab results with patient.  Patient was managed in the ED with:  -Zofran 4 mg IV x 1  -NS 1 liter IV bolus  -Rocephin 1 gm IV x 1  The response to treatment was nausea resolved. Prescription given from Bactrim DS. Patient to follow up with PCP next week. Patient to call cardiologist to reschedule pacemaker placement. Patient agreed to treatment plan and verbalized understanding.  Patient was discharged in stable condition.  Detailed return precautions discussed.                                Clinical Impression:   Final diagnoses:  [R11.2] Nausea and vomiting, unspecified vomiting type  [N39.0] Urinary tract infection without hematuria, site unspecified (Primary)        ED Disposition Condition    Discharge Stable          ED Prescriptions       Medication Sig Dispense Start Date End Date Auth. Provider    sulfamethoxazole-trimethoprim 800-160mg (BACTRIM DS) 800-160 mg Tab Take 1 tablet by mouth 2 (two) times daily. for 10 days 20 tablet 9/14/2023 9/24/2023 Whitley Webb FNP          Follow-up Information       Follow up With Specialties Details Why Contact Info    Meron Higgins MD Internal Medicine Call in 1 day Schedule follow up appointment. [address]  [facility] [place] [address]  [phone]               Whitley Webb FNP  09/14/23 6502

## 2023-09-14 NOTE — DISCHARGE INSTRUCTIONS
-Clear liquids x 12 hours, advance diet as tolerated.   -Take Zofran ODT as directed for nausea and vomiting.   -Start Bactrim DS today. Take with food and a full glass of water to decrease risk of stomach upset  -Call cardiologist to reschedule pacemaker placement.

## 2023-09-14 NOTE — ED TRIAGE NOTES
Presents with c/o continued vomiting since Monday.  Started started having n/v/d on Sunday night.  Went to the doctor on Tuesday and was given medicine for nausea and diarrhea.  States diarrhea has stopped but has continued to vomit.  States has not had anything to eat today but has not vomited today either.  States sipped some sprite on the way to the ED.  Neighbor brought him and states he was supposed to get a pacemaker at [facility] today

## 2023-09-16 ENCOUNTER — HOSPITAL ENCOUNTER (OUTPATIENT)
Facility: HOSPITAL | Age: 83
Discharge: HOME | End: 2023-09-18
Attending: FAMILY MEDICINE | Admitting: FAMILY MEDICINE
Payer: MEDICARE

## 2023-09-16 DIAGNOSIS — E11.59 TYPE 2 DIABETES MELLITUS WITH OTHER CIRCULATORY COMPLICATION, WITHOUT LONG-TERM CURRENT USE OF INSULIN: ICD-10-CM

## 2023-09-16 DIAGNOSIS — N39.0 URINARY TRACT INFECTION WITHOUT HEMATURIA, SITE UNSPECIFIED: ICD-10-CM

## 2023-09-16 DIAGNOSIS — R11.2 INTRACTABLE VOMITING WITH NAUSEA: Primary | ICD-10-CM

## 2023-09-16 DIAGNOSIS — R00.1 BRADYCARDIA: ICD-10-CM

## 2023-09-16 PROBLEM — N30.00 ACUTE CYSTITIS WITHOUT HEMATURIA: Status: ACTIVE | Noted: 2023-09-16

## 2023-09-16 LAB
ALBUMIN SERPL BCP-MCNC: 3.1 G/DL (ref 3.5–5)
ALBUMIN/GLOB SERPL: 0.7 {RATIO}
ALP SERPL-CCNC: 106 U/L (ref 45–115)
ALT SERPL W P-5'-P-CCNC: 19 U/L (ref 16–61)
ANION GAP SERPL CALCULATED.3IONS-SCNC: 11 MMOL/L (ref 7–16)
AST SERPL W P-5'-P-CCNC: 31 U/L (ref 15–37)
BASOPHILS # BLD AUTO: 0.02 K/UL (ref 0–0.2)
BASOPHILS NFR BLD AUTO: 0.3 % (ref 0–1)
BILIRUB SERPL-MCNC: 1.5 MG/DL (ref ?–1.2)
BUN SERPL-MCNC: 11 MG/DL (ref 7–18)
BUN/CREAT SERPL: 9 (ref 6–20)
CALCIUM SERPL-MCNC: 8.8 MG/DL (ref 8.5–10.1)
CHLORIDE SERPL-SCNC: 98 MMOL/L (ref 98–107)
CO2 SERPL-SCNC: 32 MMOL/L (ref 21–32)
CREAT SERPL-MCNC: 1.26 MG/DL (ref 0.7–1.3)
DIFFERENTIAL METHOD BLD: ABNORMAL
EGFR (NO RACE VARIABLE) (RUSH/TITUS): 57 ML/MIN/1.73M2
EOSINOPHIL # BLD AUTO: 0.1 K/UL (ref 0–0.5)
EOSINOPHIL NFR BLD AUTO: 1.5 % (ref 1–4)
ERYTHROCYTE [DISTWIDTH] IN BLOOD BY AUTOMATED COUNT: 13.2 % (ref 11.5–14.5)
GLOBULIN SER-MCNC: 4.4 G/DL (ref 2–4)
GLUCOSE SERPL-MCNC: 111 MG/DL (ref 74–106)
GLUCOSE SERPL-MCNC: 126 MG/DL (ref 70–105)
HCT VFR BLD AUTO: 37.1 % (ref 40–54)
HGB BLD-MCNC: 12.1 G/DL (ref 13.5–18)
LACTATE SERPL-SCNC: 1.1 MMOL/L (ref 0.4–2)
LIPASE SERPL-CCNC: 62 U/L (ref 73–393)
LYMPHOCYTES # BLD AUTO: 2.18 K/UL (ref 1–4.8)
LYMPHOCYTES NFR BLD AUTO: 31.6 % (ref 27–41)
MAGNESIUM SERPL-MCNC: 1.9 MG/DL (ref 1.7–2.3)
MCH RBC QN AUTO: 28.1 PG (ref 27–31)
MCHC RBC AUTO-ENTMCNC: 32.6 G/DL (ref 32–36)
MCV RBC AUTO: 86.3 FL (ref 80–96)
MONOCYTES # BLD AUTO: 0.62 K/UL (ref 0–0.8)
MONOCYTES NFR BLD AUTO: 9 % (ref 2–6)
MPC BLD CALC-MCNC: 10.9 FL (ref 9.4–12.4)
NEUTROPHILS # BLD AUTO: 3.97 K/UL (ref 1.8–7.7)
NEUTROPHILS NFR BLD AUTO: 57.6 % (ref 53–65)
PLATELET # BLD AUTO: 287 K/UL (ref 150–400)
POTASSIUM SERPL-SCNC: 3.1 MMOL/L (ref 3.5–5.1)
PROT SERPL-MCNC: 7.5 G/DL (ref 6.4–8.2)
RBC # BLD AUTO: 4.3 M/UL (ref 4.6–6.2)
SODIUM SERPL-SCNC: 138 MMOL/L (ref 136–145)
WBC # BLD AUTO: 6.89 K/UL (ref 4.5–11)

## 2023-09-16 PROCEDURE — 96375 TX/PRO/DX INJ NEW DRUG ADDON: CPT

## 2023-09-16 PROCEDURE — G0378 HOSPITAL OBSERVATION PER HR: HCPCS

## 2023-09-16 PROCEDURE — 63600175 PHARM REV CODE 636 W HCPCS: Performed by: NURSE PRACTITIONER

## 2023-09-16 PROCEDURE — 25000003 PHARM REV CODE 250: Performed by: NURSE PRACTITIONER

## 2023-09-16 PROCEDURE — 96361 HYDRATE IV INFUSION ADD-ON: CPT

## 2023-09-16 PROCEDURE — 83605 ASSAY OF LACTIC ACID: CPT | Performed by: NURSE PRACTITIONER

## 2023-09-16 PROCEDURE — 99285 EMERGENCY DEPT VISIT HI MDM: CPT | Mod: GF

## 2023-09-16 PROCEDURE — 96365 THER/PROPH/DIAG IV INF INIT: CPT

## 2023-09-16 PROCEDURE — 99285 EMERGENCY DEPT VISIT HI MDM: CPT | Mod: 25

## 2023-09-16 PROCEDURE — 83690 ASSAY OF LIPASE: CPT | Performed by: NURSE PRACTITIONER

## 2023-09-16 PROCEDURE — 25500020 PHARM REV CODE 255: Performed by: NURSE PRACTITIONER

## 2023-09-16 PROCEDURE — 85025 COMPLETE CBC W/AUTO DIFF WBC: CPT | Performed by: NURSE PRACTITIONER

## 2023-09-16 PROCEDURE — 80053 COMPREHEN METABOLIC PANEL: CPT | Performed by: NURSE PRACTITIONER

## 2023-09-16 PROCEDURE — 82962 GLUCOSE BLOOD TEST: CPT

## 2023-09-16 PROCEDURE — 83735 ASSAY OF MAGNESIUM: CPT | Performed by: NURSE PRACTITIONER

## 2023-09-16 PROCEDURE — 84134 ASSAY OF PREALBUMIN: CPT | Performed by: NURSE PRACTITIONER

## 2023-09-16 RX ORDER — IBUPROFEN 200 MG
24 TABLET ORAL
Status: DISCONTINUED | OUTPATIENT
Start: 2023-09-16 | End: 2023-09-18 | Stop reason: HOSPADM

## 2023-09-16 RX ORDER — ATORVASTATIN CALCIUM 40 MG/1
40 TABLET, FILM COATED ORAL DAILY
Status: DISCONTINUED | OUTPATIENT
Start: 2023-09-17 | End: 2023-09-18 | Stop reason: HOSPADM

## 2023-09-16 RX ORDER — SODIUM CHLORIDE AND POTASSIUM CHLORIDE 150; 450 MG/100ML; MG/100ML
INJECTION, SOLUTION INTRAVENOUS CONTINUOUS
Status: DISCONTINUED | OUTPATIENT
Start: 2023-09-16 | End: 2023-09-18

## 2023-09-16 RX ORDER — IBUPROFEN 200 MG
16 TABLET ORAL
Status: DISCONTINUED | OUTPATIENT
Start: 2023-09-16 | End: 2023-09-18 | Stop reason: HOSPADM

## 2023-09-16 RX ORDER — TALC
6 POWDER (GRAM) TOPICAL NIGHTLY PRN
Status: DISCONTINUED | OUTPATIENT
Start: 2023-09-16 | End: 2023-09-18 | Stop reason: HOSPADM

## 2023-09-16 RX ORDER — INSULIN ASPART 100 [IU]/ML
0-10 INJECTION, SOLUTION INTRAVENOUS; SUBCUTANEOUS
Status: DISCONTINUED | OUTPATIENT
Start: 2023-09-16 | End: 2023-09-18 | Stop reason: HOSPADM

## 2023-09-16 RX ORDER — DONEPEZIL HYDROCHLORIDE 5 MG/1
10 TABLET, FILM COATED ORAL NIGHTLY
Status: DISCONTINUED | OUTPATIENT
Start: 2023-09-16 | End: 2023-09-18 | Stop reason: HOSPADM

## 2023-09-16 RX ORDER — AMOXICILLIN 250 MG
1 CAPSULE ORAL 2 TIMES DAILY
Status: DISCONTINUED | OUTPATIENT
Start: 2023-09-16 | End: 2023-09-17

## 2023-09-16 RX ORDER — MAG HYDROX/ALUMINUM HYD/SIMETH 200-200-20
15 SUSPENSION, ORAL (FINAL DOSE FORM) ORAL EVERY 6 HOURS PRN
Status: DISCONTINUED | OUTPATIENT
Start: 2023-09-16 | End: 2023-09-18 | Stop reason: HOSPADM

## 2023-09-16 RX ORDER — GABAPENTIN 400 MG/1
400 CAPSULE ORAL 2 TIMES DAILY
Status: DISCONTINUED | OUTPATIENT
Start: 2023-09-16 | End: 2023-09-18 | Stop reason: HOSPADM

## 2023-09-16 RX ORDER — ACETAMINOPHEN 325 MG/1
650 TABLET ORAL EVERY 6 HOURS PRN
Status: DISCONTINUED | OUTPATIENT
Start: 2023-09-16 | End: 2023-09-18 | Stop reason: HOSPADM

## 2023-09-16 RX ORDER — PANTOPRAZOLE SODIUM 40 MG/1
40 TABLET, DELAYED RELEASE ORAL DAILY
Status: DISCONTINUED | OUTPATIENT
Start: 2023-09-17 | End: 2023-09-18 | Stop reason: HOSPADM

## 2023-09-16 RX ORDER — CITALOPRAM 20 MG/1
40 TABLET, FILM COATED ORAL DAILY
Status: DISCONTINUED | OUTPATIENT
Start: 2023-09-17 | End: 2023-09-18 | Stop reason: HOSPADM

## 2023-09-16 RX ORDER — ONDANSETRON 2 MG/ML
4 INJECTION INTRAMUSCULAR; INTRAVENOUS EVERY 6 HOURS PRN
Status: DISCONTINUED | OUTPATIENT
Start: 2023-09-16 | End: 2023-09-18 | Stop reason: HOSPADM

## 2023-09-16 RX ORDER — DOCUSATE SODIUM 283 MG/5ML
1 LIQUID RECTAL DAILY PRN
Status: DISCONTINUED | OUTPATIENT
Start: 2023-09-16 | End: 2023-09-18 | Stop reason: HOSPADM

## 2023-09-16 RX ORDER — TRAZODONE HYDROCHLORIDE 50 MG/1
50 TABLET ORAL NIGHTLY
Status: DISCONTINUED | OUTPATIENT
Start: 2023-09-16 | End: 2023-09-18 | Stop reason: HOSPADM

## 2023-09-16 RX ORDER — ONDANSETRON 2 MG/ML
4 INJECTION INTRAMUSCULAR; INTRAVENOUS
Status: COMPLETED | OUTPATIENT
Start: 2023-09-16 | End: 2023-09-16

## 2023-09-16 RX ORDER — POLYETHYLENE GLYCOL 3350 17 G/17G
17 POWDER, FOR SOLUTION ORAL DAILY PRN
Status: DISCONTINUED | OUTPATIENT
Start: 2023-09-16 | End: 2023-09-18 | Stop reason: HOSPADM

## 2023-09-16 RX ORDER — LOSARTAN POTASSIUM 50 MG/1
50 TABLET ORAL DAILY
Status: DISCONTINUED | OUTPATIENT
Start: 2023-09-17 | End: 2023-09-18 | Stop reason: HOSPADM

## 2023-09-16 RX ORDER — GLUCAGON 1 MG
1 KIT INJECTION
Status: DISCONTINUED | OUTPATIENT
Start: 2023-09-16 | End: 2023-09-18 | Stop reason: HOSPADM

## 2023-09-16 RX ORDER — TAMSULOSIN HYDROCHLORIDE 0.4 MG/1
0.4 CAPSULE ORAL DAILY
Status: DISCONTINUED | OUTPATIENT
Start: 2023-09-17 | End: 2023-09-18 | Stop reason: HOSPADM

## 2023-09-16 RX ADMIN — IOPAMIDOL 100 ML: 755 INJECTION, SOLUTION INTRAVENOUS at 04:09

## 2023-09-16 RX ADMIN — ONDANSETRON 4 MG: 2 INJECTION INTRAMUSCULAR; INTRAVENOUS at 02:09

## 2023-09-16 RX ADMIN — APIXABAN 5 MG: 2.5 TABLET, FILM COATED ORAL at 08:09

## 2023-09-16 RX ADMIN — POTASSIUM CHLORIDE AND SODIUM CHLORIDE 100 ML/HR: 450; 150 INJECTION, SOLUTION INTRAVENOUS at 08:09

## 2023-09-16 RX ADMIN — TRAZODONE HYDROCHLORIDE 50 MG: 50 TABLET ORAL at 08:09

## 2023-09-16 RX ADMIN — GABAPENTIN 400 MG: 400 CAPSULE ORAL at 08:09

## 2023-09-16 RX ADMIN — CEFTRIAXONE 1 G: 1 INJECTION, POWDER, FOR SOLUTION INTRAMUSCULAR; INTRAVENOUS at 05:09

## 2023-09-16 RX ADMIN — SODIUM CHLORIDE 1000 ML: 9 INJECTION, SOLUTION INTRAVENOUS at 02:09

## 2023-09-16 RX ADMIN — DONEPEZIL HYDROCHLORIDE 10 MG: 5 TABLET ORAL at 08:09

## 2023-09-16 NOTE — ED PROVIDER NOTES
Encounter Date: 9/16/2023       History     Chief Complaint   Patient presents with    Abdominal Pain     84 y/o AAM  with PMHx: DM, HTN presents to the ED per POV with compliant of nausea, vomiting and unable to keep down liquids that started on 9/10. He was seen here at [facility] ED on 9/14, diagnosed with UTI. Nausea and vomiting resolved, but started again this morning. Also having epigastric pain. Had normal bm yesterday. Tried to drink chicken broth, but vomited it up. Has been unable keep down food or medications today.     Patient was to have pacemaker placed on 9/14, but did not have due to nausea and vomiting.   Patient is on Eliquis, but he is unsure why he takes it. He is followed by Dr. Lacey, cardiologist.    The history is provided by the patient.     Review of patient's allergies indicates:   Allergen Reactions    Ciprofloxacin Itching     Past Medical History:   Diagnosis Date    Diabetes mellitus     Hypertension      Past Surgical History:   Procedure Laterality Date    CHOLECYSTECTOMY       No family history on file.  Social History     Tobacco Use    Smoking status: Never    Smokeless tobacco: Current     Types: Snuff   Substance Use Topics    Alcohol use: Not Currently    Drug use: Never     Review of Systems   Constitutional:  Positive for activity change, appetite change and fatigue. Negative for fever.   HENT:  Positive for hearing loss (chronic [place] Negative for congestion, ear discharge, ear pain, facial swelling, rhinorrhea, sinus pressure, sinus pain, sore throat and trouble swallowing.    Eyes: Negative.    Respiratory:  Negative for cough and shortness of breath.    Cardiovascular: Negative.  Negative for chest pain, palpitations and leg swelling.   Gastrointestinal:  Positive for abdominal pain, nausea and vomiting. Negative for constipation and diarrhea.   Genitourinary:  Positive for frequency. Negative for dysuria.   Musculoskeletal: Negative.    Skin: Negative.     Neurological:  Positive for weakness (generalized weakness). Negative for dizziness, syncope, facial asymmetry, speech difficulty and headaches.   Hematological: Negative.    Psychiatric/Behavioral: Negative.         Physical Exam     Initial Vitals [09/16/23 1248]   BP Pulse Resp Temp SpO2   (!) 144/101 (!) 43 18 98.7 °F (37.1 °C) 98 %      MAP       --         Physical Exam    Nursing note and vitals reviewed.  Constitutional: He appears well-developed and well-nourished. He is cooperative. He appears ill. No distress.   HENT:   Head: Normocephalic and atraumatic.   Right Ear: External ear normal.   Left Ear: External ear normal.   Nose: Nose normal.   Mouth/Throat: Mucous membranes are dry.   Eyes: Conjunctivae and lids are normal. Pupils are equal, round, and reactive to light.   Neck: Neck supple.   Normal range of motion.  Cardiovascular:  Normal heart sounds, intact distal pulses and normal pulses.   Bradycardia present.         No edema   Pulmonary/Chest: Effort normal and breath sounds normal. He has no decreased breath sounds. He has no wheezes. He has no rhonchi. He has no rales.   Abdominal: Abdomen is soft. Bowel sounds are normal. There is abdominal tenderness in the epigastric area.   No right CVA tenderness.  No left CVA tenderness. There is no rebound, no guarding and negative Kessler's sign.   Musculoskeletal:         General: Normal range of motion.      Cervical back: Normal range of motion and neck supple.     Lymphadenopathy:     He has no cervical adenopathy.   Neurological: He is alert and oriented to person, place, and time. He has normal strength. No cranial nerve deficit or sensory deficit. Coordination and gait normal.   Skin: Skin is warm and dry. Capillary refill takes less than 2 seconds. No rash noted.   Psychiatric: He has a normal mood and affect. His speech is normal and behavior is normal. Judgment and thought content normal.         Medical Screening Exam   See Full Note    ED  Course   Procedures  Labs Reviewed   COMPREHENSIVE METABOLIC PANEL - Abnormal; Notable for the following components:       Result Value    Potassium 3.1 (*)     Glucose 111 (*)     Albumin 3.1 (*)     Globulin 4.4 (*)     Bilirubin, Total 1.5 (*)     eGFR 57 (*)     All other components within normal limits   LIPASE - Abnormal; Notable for the following components:    Lipase 62 (*)     All other components within normal limits   CBC WITH DIFFERENTIAL - Abnormal; Notable for the following components:    RBC 4.30 (*)     Hemoglobin 12.1 (*)     Hematocrit 37.1 (*)     Monocytes % 9.0 (*)     All other components within normal limits   LACTIC ACID, PLASMA - Normal   MAGNESIUM - Normal   CBC W/ AUTO DIFFERENTIAL    Narrative:     The following orders were created for panel order CBC auto differential.  Procedure                               Abnormality         Status                     ---------                               -----------         ------                     CBC with Differential[[id number]]        Abnormal            Final result                 Please view results for these tests on the individual orders.          Imaging Results              CT Abdomen Pelvis With Contrast (Final result)  Result time 09/16/23 17:28:10      Final result by Lonnie Dawkins MD (09/16/23 17:28:10)                   Impression:      1. No evidence to suggest acute pathology within the abdomen or pelvis.    2.  Two separate possible solid left renal masses, measuring up to 2.3 cm, concerning for neoplasm.  Consider tissue sampling when clinically feasible.  Comparison to any prior imaging would also be helpful if can be made available.    3.  Normal appendix.  No hydronephrosis or nephrolithiasis.  Prior cholecystectomy.    Place of service: [facility]      Electronically signed by: Lonnie Dawkins  Date:    09/16/2023  Time:    17:28               Narrative:    EXAMINATION  CT ABDOMEN PELVIS WITH CONTRAST    CLINICAL  HISTORY:  Nausea/vomiting;Abdominal pain, acute, nonlocalized;    TECHNIQUE:  3 mm axial images were obtained from the lung bases through the ischial tuberosities status post administration of 100 cc of Isovue-370. No immediate complication from administration of contrast media. Coronal and sagittal reformatted images were additionally created and submitted for review. Total DLP: 744 mGy/cm.    Dose reduction:    The CT exam was performed using one or more dose reduction techniques: Automatic exposure control, automated adjustment of the MA and/or kVP according to patient size, or use of iterative reconstruction technique.    COMPARISON:  Abdomen radiograph 07/19/2023    FINDINGS:  Lung bases: Lung bases are predominantly clear.  No significant pleural/pericardial effusion.    Abdomen:    Liver and Gallbladder: No abnormal enhancement.  Cholecystectomy clips noted.  No biliary ductal dilatation.  Portal vein is patent.    Spleen: Unremarkable    Pancreas: Unremarkable    Adrenals: Unremarkable    Kidneys: Both kidneys are equally perfused and demonstrate no evidence for obstructive uropathy. Solid-appearing 2.3 cm left endophytic renal lesion midpole kidney and similar possible solid exophytic 1 cm left renal lesion on image 68 are noted.  No prior imaging is available for comparison purposes.    Bowel and Mesentery: Small bowel is nondilated.  Small hiatal hernia.  No free fluid/air within the abdomen or pelvis.  Scattered fecal material is noted throughout the colon which is otherwise grossly unremarkable in course/caliber.  No significant mesenteric adenopathy.  The appendix is normal.    Retroperitoneum: No enlarged lymph nodes.    Pelvis:    Bladder: Unremarkable    Fluid: No free fluid identified.    Lymph nodes: No enlarged lymph nodes.    Pelvic organs: Unremarkable    Osseous structures: No suspicious appearing osseous abnormalities noted.                                       Medications   cefTRIAXone  (ROCEPHIN) 1 g in dextrose 5 % in water (D5W) 100 mL IVPB (MB+) (1 g Intravenous New Bag 9/16/23 1720)   sodium chloride 0.9% bolus 1,000 mL 1,000 mL (0 mLs Intravenous Stopped 9/16/23 1539)   ondansetron injection 4 mg (4 mg Intravenous Given 9/16/23 1444)   iopamidoL (ISOVUE-370) injection 100 mL (100 mLs Intravenous Given 9/16/23 0423)     Medical Decision Making  HR 43, /101. Oral mucus membranes dry, otherwise normal HEENT. Lungs CTA. BS + x 4 quads, soft with epigastric tenderness. No rebound or guarding noted.     Amount and/or Complexity of Data Reviewed  Labs: ordered.     Details: CBC: WBC 6.89, H&H 12.1/37.1  CMP: Na 138, K+ 3.1, BUN/CR 11/1.26, glucose 111 mg/dL  Mag 1.9  Lipase 62  Lactate 1.1  Radiology: ordered.     Details: CT abd pelvis:   1. No evidence to suggest acute pathology within the abdomen or pelvis.     2.  Two separate possible solid left renal masses, measuring up to 2.3 cm, concerning for neoplasm.  Consider tissue sampling when clinically feasible.  Comparison to any prior imaging would also be helpful if can be made available.     3.  Normal appendix.  No hydronephrosis or nephrolithiasis.  Prior cholecystectomy  Discussion of management or test interpretation with external provider(s): MDM  Admission MDM  I discussed the patient presentation labs and CT findings with Dr. Jones.  Patient was managed in the ED with:  -Bolus  ml, then at 100 ml/hr  -Rocephin 1 gm IV x 1    Dr. Jones agreed with admission to [facility] for IV fluids, potassium replacement and IV antibiotic for UTI. Patient and his family agreed to treatment plan.    Risk  Prescription drug management.                               Clinical Impression:   Final diagnoses:  [R11.2] Intractable vomiting with nausea (Primary)  [N39.0] Urinary tract infection without hematuria, site unspecified        ED Disposition Condition    Observation Stable                Whitley Webb, FNP  09/16/23  0922

## 2023-09-16 NOTE — ED TRIAGE NOTES
Amb to ER with cane assistance with c/o abd pain, diarrhea and nausea since ER visit yesterday.  Per chart pt had the same s/s at ER visit 09/14/2023.  C/O tenderness to epigastric area.

## 2023-09-17 PROBLEM — E87.6 HYPOKALEMIA: Status: ACTIVE | Noted: 2023-09-17

## 2023-09-17 PROBLEM — E11.9 DIABETES MELLITUS, TYPE 2: Status: ACTIVE | Noted: 2023-09-17

## 2023-09-17 PROBLEM — R00.1 BRADYCARDIA: Status: ACTIVE | Noted: 2023-09-17

## 2023-09-17 LAB
ANION GAP SERPL CALCULATED.3IONS-SCNC: 9 MMOL/L (ref 7–16)
BASOPHILS # BLD AUTO: 0.02 K/UL (ref 0–0.2)
BASOPHILS NFR BLD AUTO: 0.4 % (ref 0–1)
BUN SERPL-MCNC: 10 MG/DL (ref 7–18)
BUN/CREAT SERPL: 8 (ref 6–20)
CALCIUM SERPL-MCNC: 8.6 MG/DL (ref 8.5–10.1)
CHLORIDE SERPL-SCNC: 103 MMOL/L (ref 98–107)
CO2 SERPL-SCNC: 31 MMOL/L (ref 21–32)
CREAT SERPL-MCNC: 1.19 MG/DL (ref 0.7–1.3)
DIFFERENTIAL METHOD BLD: ABNORMAL
EGFR (NO RACE VARIABLE) (RUSH/TITUS): 61 ML/MIN/1.73M2
EOSINOPHIL # BLD AUTO: 0.28 K/UL (ref 0–0.5)
EOSINOPHIL NFR BLD AUTO: 5.1 % (ref 1–4)
ERYTHROCYTE [DISTWIDTH] IN BLOOD BY AUTOMATED COUNT: 13.2 % (ref 11.5–14.5)
EST. AVERAGE GLUCOSE BLD GHB EST-MCNC: 170 MG/DL
GLUCOSE SERPL-MCNC: 202 MG/DL (ref 70–105)
GLUCOSE SERPL-MCNC: 211 MG/DL (ref 70–105)
GLUCOSE SERPL-MCNC: 310 MG/DL (ref 70–105)
GLUCOSE SERPL-MCNC: 81 MG/DL (ref 74–106)
HBA1C MFR BLD HPLC: 7.7 % (ref 4.5–6.6)
HCT VFR BLD AUTO: 36.6 % (ref 40–54)
HGB BLD-MCNC: 11.9 G/DL (ref 13.5–18)
LYMPHOCYTES # BLD AUTO: 1.86 K/UL (ref 1–4.8)
LYMPHOCYTES NFR BLD AUTO: 33.6 % (ref 27–41)
MCH RBC QN AUTO: 28.1 PG (ref 27–31)
MCHC RBC AUTO-ENTMCNC: 32.5 G/DL (ref 32–36)
MCV RBC AUTO: 86.5 FL (ref 80–96)
MONOCYTES # BLD AUTO: 0.7 K/UL (ref 0–0.8)
MONOCYTES NFR BLD AUTO: 12.6 % (ref 2–6)
MPC BLD CALC-MCNC: 10.5 FL (ref 9.4–12.4)
NEUTROPHILS # BLD AUTO: 2.68 K/UL (ref 1.8–7.7)
NEUTROPHILS NFR BLD AUTO: 48.3 % (ref 53–65)
PLATELET # BLD AUTO: 257 K/UL (ref 150–400)
POTASSIUM SERPL-SCNC: 3.2 MMOL/L (ref 3.5–5.1)
PREALB SERPL NEPH-MCNC: 25 MG/DL (ref 20–40)
RBC # BLD AUTO: 4.23 M/UL (ref 4.6–6.2)
SODIUM SERPL-SCNC: 140 MMOL/L (ref 136–145)
UA COMPLETE W REFLEX CULTURE PNL UR: ABNORMAL
WBC # BLD AUTO: 5.54 K/UL (ref 4.5–11)

## 2023-09-17 PROCEDURE — 96372 THER/PROPH/DIAG INJ SC/IM: CPT | Performed by: NURSE PRACTITIONER

## 2023-09-17 PROCEDURE — 99222 1ST HOSP IP/OBS MODERATE 55: CPT | Performed by: NURSE PRACTITIONER

## 2023-09-17 PROCEDURE — 96366 THER/PROPH/DIAG IV INF ADDON: CPT

## 2023-09-17 PROCEDURE — 85025 COMPLETE CBC W/AUTO DIFF WBC: CPT | Performed by: NURSE PRACTITIONER

## 2023-09-17 PROCEDURE — 63600175 PHARM REV CODE 636 W HCPCS: Performed by: NURSE PRACTITIONER

## 2023-09-17 PROCEDURE — 99232 SBSQ HOSP IP/OBS MODERATE 35: CPT | Performed by: NURSE PRACTITIONER

## 2023-09-17 PROCEDURE — 96361 HYDRATE IV INFUSION ADD-ON: CPT

## 2023-09-17 PROCEDURE — 93005 ELECTROCARDIOGRAM TRACING: CPT

## 2023-09-17 PROCEDURE — G0378 HOSPITAL OBSERVATION PER HR: HCPCS

## 2023-09-17 PROCEDURE — 80048 BASIC METABOLIC PNL TOTAL CA: CPT | Performed by: NURSE PRACTITIONER

## 2023-09-17 PROCEDURE — 99222 PR INITIAL HOSPITAL CARE,LEVL II: ICD-10-PCS | Mod: AI,,, | Performed by: FAMILY MEDICINE

## 2023-09-17 PROCEDURE — 82962 GLUCOSE BLOOD TEST: CPT | Mod: 91

## 2023-09-17 PROCEDURE — 25000003 PHARM REV CODE 250: Performed by: NURSE PRACTITIONER

## 2023-09-17 PROCEDURE — 93010 EKG 12-LEAD: ICD-10-PCS | Mod: ,,, | Performed by: STUDENT IN AN ORGANIZED HEALTH CARE EDUCATION/TRAINING PROGRAM

## 2023-09-17 PROCEDURE — 99222 1ST HOSP IP/OBS MODERATE 55: CPT | Mod: AI,,, | Performed by: FAMILY MEDICINE

## 2023-09-17 PROCEDURE — 83036 HEMOGLOBIN GLYCOSYLATED A1C: CPT | Performed by: NURSE PRACTITIONER

## 2023-09-17 PROCEDURE — 93010 ELECTROCARDIOGRAM REPORT: CPT | Mod: ,,, | Performed by: STUDENT IN AN ORGANIZED HEALTH CARE EDUCATION/TRAINING PROGRAM

## 2023-09-17 PROCEDURE — 94761 N-INVAS EAR/PLS OXIMETRY MLT: CPT

## 2023-09-17 RX ORDER — POTASSIUM CHLORIDE 20 MEQ/1
20 TABLET, EXTENDED RELEASE ORAL ONCE
Status: COMPLETED | OUTPATIENT
Start: 2023-09-17 | End: 2023-09-17

## 2023-09-17 RX ADMIN — APIXABAN 5 MG: 2.5 TABLET, FILM COATED ORAL at 09:09

## 2023-09-17 RX ADMIN — ATORVASTATIN CALCIUM 40 MG: 40 TABLET, FILM COATED ORAL at 08:09

## 2023-09-17 RX ADMIN — CITALOPRAM HYDROBROMIDE 40 MG: 20 TABLET ORAL at 08:09

## 2023-09-17 RX ADMIN — INSULIN ASPART 4 UNITS: 100 INJECTION, SOLUTION INTRAVENOUS; SUBCUTANEOUS at 11:09

## 2023-09-17 RX ADMIN — PANTOPRAZOLE SODIUM 40 MG: 40 TABLET, DELAYED RELEASE ORAL at 08:09

## 2023-09-17 RX ADMIN — TAMSULOSIN HYDROCHLORIDE 0.4 MG: 0.4 CAPSULE ORAL at 08:09

## 2023-09-17 RX ADMIN — GABAPENTIN 400 MG: 400 CAPSULE ORAL at 09:09

## 2023-09-17 RX ADMIN — POTASSIUM CHLORIDE AND SODIUM CHLORIDE 100 ML/HR: 450; 150 INJECTION, SOLUTION INTRAVENOUS at 04:09

## 2023-09-17 RX ADMIN — GABAPENTIN 400 MG: 400 CAPSULE ORAL at 08:09

## 2023-09-17 RX ADMIN — CEFTRIAXONE 1 G: 1 INJECTION, POWDER, FOR SOLUTION INTRAMUSCULAR; INTRAVENOUS at 04:09

## 2023-09-17 RX ADMIN — POTASSIUM CHLORIDE 20 MEQ: 1500 TABLET, EXTENDED RELEASE ORAL at 08:09

## 2023-09-17 RX ADMIN — APIXABAN 5 MG: 2.5 TABLET, FILM COATED ORAL at 08:09

## 2023-09-17 RX ADMIN — TRAZODONE HYDROCHLORIDE 50 MG: 50 TABLET ORAL at 09:09

## 2023-09-17 RX ADMIN — POTASSIUM CHLORIDE AND SODIUM CHLORIDE: 450; 150 INJECTION, SOLUTION INTRAVENOUS at 06:09

## 2023-09-17 RX ADMIN — DONEPEZIL HYDROCHLORIDE 10 MG: 5 TABLET ORAL at 09:09

## 2023-09-17 RX ADMIN — INSULIN ASPART 4 UNITS: 100 INJECTION, SOLUTION INTRAVENOUS; SUBCUTANEOUS at 06:09

## 2023-09-17 RX ADMIN — INSULIN ASPART 4 UNITS: 100 INJECTION, SOLUTION INTRAVENOUS; SUBCUTANEOUS at 09:09

## 2023-09-17 NOTE — PROGRESS NOTES
Patient admitted to [facility]. Currently on Rocephin IV which will cover Klebsiella Pneumoniae.

## 2023-09-17 NOTE — ASSESSMENT & PLAN NOTE
09/17/2023: Patient has known bradycardia. Was scheduled for pacemaker this past week, but unable to go due to nausea with vomiting. Baseline EKG done. Patient will need to follow up appointment with cardiologist after discharge.

## 2023-09-17 NOTE — H&P
[facility] - Medical Surgical Unit  Hospital Medicine  History & Physical    Patient Name: Rebel Fisher  MRN: [id number]  Patient Class: OP- Observation  Admission Date: 9/16/2023  Attending Physician: Tin Saucedo DO   Primary Care Provider: Meron Higgins MD         Patient information was obtained from patient and ER records.     Subjective:     Principal Problem:Intractable vomiting with nausea    Chief Complaint:   Chief Complaint   Patient presents with    Abdominal Pain        HPI: 82 y/o AAM with PMHx: DM, HTN and BPH is admitted from [facility] for IV fluids and antibiotics for intractable nausea with vomiting and UTI. This was patient's second visit to the ED this week for nausea and vomiting that started on 09/10. He was seen here in the ED on 09/14, diagnosed with UTI and nausea with vomiting resolved. Nausea with vomiting symptoms returned this morning and patient has been unable to keep down liquids.   While in the ED patient received 1 liter of NS, IV Zofran 4 mg and Rocephin 1 gm IV. UA from 9/14 was WBC 11-15, RBC 5-10 with moderate Bacteria. Urine culture grew out >100,000 gram negative Bacilli, sensitivity pending. Labs today WBC 6.89, H&H 12.1/37.1, Na 138, K+ 3.1, BUN/CR 11/1.26, glucose 111 mg/dL, mag 1.9, lipase 62, lactate 1.1. CT abdomen/pelvis with contrast revealed no acute pathology within the abdomen or pelvis. Did note two separate possible solid left renal masses that was concerning for neoplasm.  Normal appendix.  No hydronephrosis or nephrolithiasis.  Prior cholecystectomy.  No available imaging to compare. Will need further evaluation and work up after discharge.       Patient was to have pacemaker placed on 9/14, but did not have due to nausea and vomiting.   Patient is on Eliquis, but he is unsure why he takes it. He is followed by Dr. Lacey, cardiologist.      Past Medical History:   Diagnosis Date    Anticoagulant long-term use     Diabetes mellitus      Hypertension        Past Surgical History:   Procedure Laterality Date    ABDOMINAL SURGERY      CHOLECYSTECTOMY         Review of patient's allergies indicates:   Allergen Reactions    Ciprofloxacin Itching       No current facility-administered medications on file prior to encounter.     Current Outpatient Medications on File Prior to Encounter   Medication Sig    apixaban (ELIQUIS) 5 mg Tab Take 5 mg by mouth 2 (two) times daily.    atorvastatin (LIPITOR) 40 MG tablet Take 40 mg by mouth once daily.    citalopram (CELEXA) 40 MG tablet Take 40 mg by mouth once daily.    diphenoxylate-atropine 2.5-0.025 mg (LOMOTIL) 2.5-0.025 mg per tablet Take 1 tablet by mouth 4 (four) times daily as needed for Diarrhea.    donepeziL (ARICEPT) 10 MG tablet Take 10 mg by mouth.    etodolac (LODINE) 400 MG tablet Take 400 mg by mouth 2 (two) times daily as needed.    gabapentin (NEURONTIN) 400 MG capsule Take 400 mg by mouth 2 (two) times daily.    glimepiride (AMARYL) 4 MG tablet Take 4 mg by mouth before breakfast. 2 tablets one time a day    irbesartan (AVAPRO) 150 MG tablet Take 150 mg by mouth every evening.    levocetirizine (XYZAL) 5 MG tablet 1 tablet in the evening Orally Once a day as needed for allergies for 30 days    metFORMIN (GLUCOPHAGE) 500 MG tablet Take 500 mg by mouth 2 (two) times daily with meals.    ondansetron (ZOFRAN) 4 MG tablet Take 1 tablet (4 mg total) by mouth every 6 (six) hours.    pantoprazole (PROTONIX) 40 MG tablet 1 tablet Orally Once a day for stomach for 90 days    potassium chloride (MICRO-K) 10 MEQ CpSR Take 10 mEq by mouth once.    sulfamethoxazole-trimethoprim 800-160mg (BACTRIM DS) 800-160 mg Tab Take 1 tablet by mouth 2 (two) times daily. for 10 days    tamsulosin (FLOMAX) 0.4 mg Cap Take by mouth once daily.    traZODone (DESYREL) 50 MG tablet Take 50 mg by mouth every evening.     Family History    None       Tobacco Use    Smoking status: Never    Smokeless  tobacco: Current     Types: Snuff   Substance and Sexual Activity    Alcohol use: Not Currently    Drug use: Never    Sexual activity: Not Currently     Review of Systems   Constitutional:  Positive for appetite change. Negative for activity change, fatigue and fever.   HENT: Negative.  Negative for congestion, ear pain, sinus pressure, sinus pain, sore throat and trouble swallowing.    Eyes: Negative.  Negative for photophobia, pain, redness and visual disturbance.   Respiratory: Negative.  Negative for cough and shortness of breath.    Cardiovascular: Negative.  Negative for chest pain, palpitations and leg swelling.   Gastrointestinal:  Positive for abdominal pain (epigastric), nausea and vomiting. Negative for constipation and diarrhea.   Genitourinary:  Positive for frequency. Negative for dysuria and hematuria.   Musculoskeletal: Negative.  Negative for gait problem, neck pain and neck stiffness.   Skin: Negative.    Neurological:  Negative for dizziness, syncope, speech difficulty, weakness, light-headedness, numbness and headaches.   Hematological: Negative.    Psychiatric/Behavioral: Negative.       Objective:     Vital Signs (Most Recent):  Temp: 98.8 °F (37.1 °C) (09/17/23 0320)  Pulse: (!) 43 (09/17/23 0320)  Resp: 13 (09/17/23 0320)  BP: (!) 145/48 (09/17/23 0320)  SpO2: 97 % (09/17/23 0320) Vital Signs (24h Range):  Temp:  [98.1 °F (36.7 °C)-99.4 °F (37.4 °C)] 98.8 °F (37.1 °C)  Pulse:  [42-48] 43  Resp:  [10-19] 13  SpO2:  [95 %-98 %] 97 %  BP: (124-182)/() 145/48     Weight: 81 kg (178 lb 9.6 oz)  Body mass index is 24.91 kg/m².     Physical Exam  Constitutional:       General: He is awake.      Appearance: Normal appearance. He is well-developed and well-groomed. He is ill-appearing. He is not toxic-appearing or diaphoretic.   HENT:      Head: Normocephalic and atraumatic.      Right Ear: Tympanic membrane, ear canal and external ear normal.      Left Ear: Tympanic membrane, ear canal and  external ear normal.      Nose: Nose normal.      Mouth/Throat:      Mouth: Mucous membranes are dry.      Pharynx: Uvula midline.   Eyes:      General: Lids are normal.      Extraocular Movements: Extraocular movements intact.      Conjunctiva/sclera: Conjunctivae normal.      Pupils: Pupils are equal, round, and reactive to light.   Cardiovascular:      Rate and Rhythm: Regular rhythm. Bradycardia present.      Pulses: Normal pulses.   Pulmonary:      Effort: Pulmonary effort is normal.      Breath sounds: Normal breath sounds and air entry. No decreased breath sounds, wheezing, rhonchi or rales.   Abdominal:      General: Bowel sounds are normal.      Palpations: Abdomen is soft.      Tenderness: There is abdominal tenderness in the epigastric area. There is no right CVA tenderness, left CVA tenderness, guarding or rebound.   Musculoskeletal:      Cervical back: Full passive range of motion without pain, normal range of motion and neck supple.      Right lower leg: No edema.      Left lower leg: No edema.   Lymphadenopathy:      Cervical: No cervical adenopathy.   Skin:     General: Skin is warm and dry.      Capillary Refill: Capillary refill takes less than 2 seconds.   Neurological:      Mental Status: He is alert and oriented to person, place, and time. Mental status is at baseline.      Cranial Nerves: Cranial nerves 2-12 are intact.      Sensory: Sensation is intact.      Motor: Motor function is intact.      Gait: Gait is intact.   Psychiatric:         Mood and Affect: Mood normal.         Speech: Speech normal.         Behavior: Behavior normal. Behavior is cooperative.         Thought Content: Thought content normal.              CRANIAL NERVES     CN III, IV, VI   Pupils are equal, round, and reactive to light.       Significant Labs: All pertinent labs within the past 24 hours have been reviewed.  Recent Lab Results         09/17/23  0530   09/16/23  2004   09/16/23  1522   09/16/23  1443         Albumin/Globulin Ratio       0.7       Albumin       3.1       Alkaline Phosphatase       106       ALT       19       Anion Gap 9       11       AST       31       Baso # 0.02       0.02       Basophil % 0.4       0.3       BILIRUBIN TOTAL       1.5       BUN 10       11       BUN/CREAT RATIO 8       9       Calcium 8.6       8.8       Chloride 103       98       CO2 31       32       Creatinine 1.19       1.26       Differential Method Auto       Auto       eGFR 61       57       Eos # 0.28       0.10       Eosinophil % 5.1       1.5       Globulin, Total       4.4       Glucose 81       111       Hematocrit 36.6       37.1       Hemoglobin 11.9       12.1       Lactate, Patel       1.1       Lipase       62       Lymph # 1.86       2.18       Lymph % 33.6       31.6       Magnesium      1.9         MCH 28.1       28.1       MCHC 32.5       32.6       MCV 86.5       86.3       Mono # 0.70       0.62       Mono % 12.6       9.0       MPV 10.5       10.9       Neutrophils, Abs 2.68       3.97       Neutrophils Relative 48.3       57.6       Platelets 257       287       POC Glucose   126           Potassium 3.2       3.1       PROTEIN TOTAL       7.5       RBC 4.23       4.30       RDW 13.2       13.2       Sodium 140       138       WBC 5.54       6.89               Significant Imaging: I have reviewed all pertinent imaging results/findings within the past 24 hours.    Assessment/Plan:     * Intractable vomiting with nausea  09/16/2023:  Will hold Lodine due to can cause GI irritation. CT of abdomen and pelvis did not reveal an acute abdominal process, but did note 2 solid renal masses that will need further work up after discharge.   -1/2 NS 20 KCL at 100 ml/hr  -Zofran 4 mg IV prn nausea with vomiting  -Clear liquid diet, advance as tolerated  -Continue Pantoprazole  40 mg daily  -BMP pending    Diabetes mellitus, type 2  09/16/2023: Patient's FSGs are controlled on current medication regimen.  Last A1c reviewed-  "No results found for: "LABA1C", "HGBA1C"  Most recent fingerstick glucose reviewed- No results for input(s): "POCTGLUCOSE" in the last 24 hours.  Current correctional scale  Medium  Maintain anti-hyperglycemic dose as follows-   Antihyperglycemics (From admission, onward)    Start     Stop Route Frequency Ordered    09/16/23 1903  insulin aspart U-100 injection 0-10 Units         -- SubQ Before meals & nightly PRN 09/16/23 1804        Hold Oral hypoglycemics while patient is in the hospital.  Currently holding Amaryl and Metformin. Metformin need to be held for at least 48 hours due to having contrasted CT scan.   -Accuchecks AC & HS with moderate SSI coverage    Hypokalemia    09/16/2023: Potassium down to 3.1. Mag level was 1.9. Patient has been unable to keep po potassium down due to nausea with vomiting. Will add potassium to IV fluids.  -1/2 NS 20 KCL at 100 ml/hr  -BMP    Acute cystitis without hematuria  09/16/2023: Urine culture grew out >100,000 gram negative Bacilli. Sensitivity is pending. Bactrim DS stopped due to nausea and vomiting.  -Rocephin 1 gm IV every 24 hours (day 1 of 5)  -CBC in am      VTE Risk Mitigation (From admission, onward)         Ordered     apixaban tablet 5 mg  2 times daily         09/16/23 1804     IP VTE HIGH RISK PATIENT  Once         09/16/23 1804     Place NATAN hose  Until discontinued         09/16/23 1804     Place sequential compression device  Until discontinued         09/16/23 1804                     On 09/17/2023, patient should be placed in hospital observation services under my care in collaboration with 30.      Whitley Webb, TYREE  Department of Hospital Medicine  [facility] - Medical Surgical Unit  "

## 2023-09-17 NOTE — ASSESSMENT & PLAN NOTE
09/16/2023: Potassium down to 3.1. Mag level was 1.9. Patient has been unable to keep po potassium down due to nausea with vomiting. Will add potassium to IV fluids.  -1/2 NS 20 KCL at 100 ml/hr  -BMP

## 2023-09-17 NOTE — ASSESSMENT & PLAN NOTE
09/16/2023:  Will hold Lodine due to can cause GI irritation. CT of abdomen and pelvis did not reveal an acute abdominal process, but did note 2 solid renal masses that will need further work up after discharge.   -1/2 NS 20 KCL at 100 ml/hr  -Zofran 4 mg IV prn nausea with vomiting  -Clear liquid diet, advance as tolerated  -Continue Pantoprazole  40 mg daily  -BMP pending    09/17/2023: Abdominal pain resolved. Continues to have nausea, but no vomiting since admission. Will advance to full liquid at breakfast today. If tolerating will continue to advance diet.

## 2023-09-17 NOTE — ASSESSMENT & PLAN NOTE
"09/16/2023: Patient's FSGs are controlled on current medication regimen.  Last A1c reviewed- No results found for: "LABA1C", "HGBA1C"  Most recent fingerstick glucose reviewed- No results for input(s): "POCTGLUCOSE" in the last 24 hours.  Current correctional scale  Medium  Maintain anti-hyperglycemic dose as follows-   Antihyperglycemics (From admission, onward)    Start     Stop Route Frequency Ordered    09/16/23 1903  insulin aspart U-100 injection 0-10 Units         -- SubQ Before meals & nightly PRN 09/16/23 1804        Hold Oral hypoglycemics while patient is in the hospital.  Currently holding Amaryl and Metformin. Metformin need to be held for at least 48 hours due to having contrasted CT scan.   -Accuchecks AC & HS with moderate SSI coverage  "

## 2023-09-17 NOTE — ASSESSMENT & PLAN NOTE
09/16/2023:  Will hold Lodine due to can cause GI irritation. CT of abdomen and pelvis did not reveal an acute abdominal process, but did note 2 solid renal masses that will need further work up after discharge.   -1/2 NS 20 KCL at 100 ml/hr  -Zofran 4 mg IV prn nausea with vomiting  -Clear liquid diet, advance as tolerated  -Continue Pantoprazole  40 mg daily  -BMP pending

## 2023-09-17 NOTE — PLAN OF CARE
Problem: Adult Inpatient Plan of Care  Goal: Plan of Care Review  Outcome: Ongoing, Progressing  Flowsheets (Taken 9/17/2023 0342)  Plan of Care Reviewed With: patient  Goal: Patient-Specific Goal (Individualized)  Outcome: Ongoing, Progressing  Goal: Absence of Hospital-Acquired Illness or Injury  Outcome: Ongoing, Progressing  Goal: Optimal Comfort and Wellbeing  Outcome: Ongoing, Progressing  Goal: Readiness for Transition of Care  Outcome: Ongoing, Progressing

## 2023-09-17 NOTE — HOSPITAL COURSE
09/17/2023: Patient is hospital day #2. Continues to received 1/2 NS 20 KCL at 100 ml/hr and Rocephin 1 gm IV for nausea with vomiting and UTI. Abdominal pain has resolved. Continues to have nausea, but has not had vomiting since admission. Labs today WBC 5.54, H&H 11.9/36.3, Na 140, K+ up to 3.2, BUN/CR improved to 10/1.19, glucose 81 mg/dL and urine culture sensitivity still pending. No new complaints voiced today.    9--patient without any nausea vomiting today.  He is tolerating liquids well.  Patient CT scan did show mass on his left renal area.  No other abnormalities.  Patient's blood sugars remained in the 200 range.  His creatinine has returned to normal with his electrolytes normal in his BUN of a creatinine 1.00.  Patient does consistently have a low heart rate and is scheduled for pacemaker.  We have contacted [facility] and has appt on 9- with Dr Lacey.  Will need a urology consultation for 2 small renal masses on the left on CT scan.  Patient is to resume his regular diabetic diet and regular medicines at home.  Patient does have urinary tract infection which is sensitive to Ceftin.  Patient does have a history of BPH and is already followed by Urology.  Patient discharged in stable condition.

## 2023-09-17 NOTE — PROGRESS NOTES
[facility] - Medical Surgical Unit  Hospital Medicine  Progress Note    Patient Name: Rebel Fisher  MRN: [id number]  Patient Class: OP- Observation   Admission Date: 9/16/2023  Length of Stay: 0 days  Attending Physician: Tin Saucedo DO  Primary Care Provider: Meron Higgins MD        Subjective:     Principal Problem:Intractable vomiting with nausea        HPI:  84 y/o AAM with PMHx: DM, HTN and BPH is admitted from [facility] ED for IV fluids and antibiotics for intractable nausea with vomiting and UTI. This was patient's second visit to the ED this week for nausea and vomiting that started on 09/10. He was seen here in the ED on 09/14, diagnosed with UTI and nausea with vomiting resolved. Nausea with vomiting symptoms returned this morning and patient has been unable to keep down liquids.   While in the ED patient received 1 liter of NS, IV Zofran 4 mg and Rocephin 1 gm IV. UA from 9/14 was WBC 11-15, RBC 5-10 with moderate Bacteria. Urine culture grew out >100,000 gram negative Bacilli, sensitivity pending. Labs today WBC 6.89, H&H 12.1/37.1, Na 138, K+ 3.1, BUN/CR 11/1.26, glucose 111 mg/dL, mag 1.9, lipase 62, lactate 1.1. CT abdomen/pelvis with contrast revealed no acute pathology within the abdomen or pelvis. Did note two separate possible solid left renal masses that was concerning for neoplasm.  Normal appendix.  No hydronephrosis or nephrolithiasis.  Prior cholecystectomy.  No available imaging to compare. Will need further evaluation and work up after discharge.       Patient was to have pacemaker placed on 9/14 at [facility], but did not have due to nausea and vomiting.   Patient is on Eliquis, but he is unsure why he takes it. He is followed by Dr. Lacey, cardiologist.      Overview/Hospital Course:  09/17/2023: Patient is hospital day #2. Continues to received 1/2 NS 20 KCL at 100 ml/hr and Rocephin 1 gm IV for nausea with vomiting and UTI. Abdominal pain has resolved.  Continues to have nausea, but has not had vomiting since admission. Labs today WBC 5.54, H&H 11.9/36.3, Na 140, K+ up to 3.2, BUN/CR improved to 10/1.19, glucose 81 mg/dL and urine culture sensitivity still pending. No new complaints voiced today.      Interval History: Epigastric pain resolved. Continues to have nausea, but no vomiting. Potassium up to 3.2 today. Urine culture sensitivity still pending.    Review of Systems   Constitutional:  Positive for appetite change (decreased). Negative for activity change, fatigue and fever.   HENT: Negative.  Negative for congestion, ear pain, sinus pressure, sinus pain, sore throat and trouble swallowing.    Eyes: Negative.  Negative for photophobia, pain, redness and visual disturbance.   Respiratory: Negative.  Negative for cough and shortness of breath.    Cardiovascular: Negative.  Negative for chest pain, palpitations and leg swelling.   Gastrointestinal:  Positive for nausea. Negative for abdominal pain (epigastric), constipation, diarrhea and vomiting.   Genitourinary: Negative.  Negative for dysuria, frequency and hematuria.   Musculoskeletal: Negative.  Negative for gait problem, neck pain and neck stiffness.   Skin: Negative.    Neurological:  Negative for dizziness, syncope, speech difficulty, weakness, light-headedness, numbness and headaches.   Hematological: Negative.    Psychiatric/Behavioral: Negative.       Objective:     Vital Signs (Most Recent):  Temp: 98.8 °F (37.1 °C) (09/17/23 0320)  Pulse: (!) 43 (09/17/23 0320)  Resp: 13 (09/17/23 0320)  BP: (!) 145/48 (09/17/23 0320)  SpO2: 97 % (09/17/23 0320) Vital Signs (24h Range):  Temp:  [98.1 °F (36.7 °C)-99.4 °F (37.4 °C)] 98.8 °F (37.1 °C)  Pulse:  [42-48] 43  Resp:  [10-19] 13  SpO2:  [95 %-98 %] 97 %  BP: (124-182)/() 145/48     Weight: 81 kg (178 lb 9.6 oz)  Body mass index is 24.91 kg/m².    Intake/Output Summary (Last 24 hours) at 9/17/2023 0739  Last data filed at 9/17/2023 0502  Gross per 24  hour   Intake 1938.33 ml   Output 450 ml   Net 1488.33 ml         Physical Exam  Vitals reviewed.   Constitutional:       General: He is awake.      Appearance: Normal appearance. He is well-developed. He is ill-appearing. He is not toxic-appearing or diaphoretic.   HENT:      Head: Normocephalic and atraumatic.      Right Ear: Tympanic membrane, ear canal and external ear normal.      Left Ear: Tympanic membrane, ear canal and external ear normal.      Nose: Nose normal.      Mouth/Throat:      Mouth: Mucous membranes are dry.      Pharynx: Uvula midline.   Eyes:      Conjunctiva/sclera: Conjunctivae normal.      Pupils: Pupils are equal, round, and reactive to light.   Cardiovascular:      Rate and Rhythm: Regular rhythm. Bradycardia present.      Pulses: Normal pulses.   Pulmonary:      Effort: Pulmonary effort is normal.      Breath sounds: Normal breath sounds and air entry. No decreased breath sounds, wheezing, rhonchi or rales.   Abdominal:      General: Bowel sounds are normal.      Palpations: Abdomen is soft.      Tenderness: There is no abdominal tenderness. There is no right CVA tenderness, left CVA tenderness, guarding or rebound.   Musculoskeletal:      Cervical back: Full passive range of motion without pain, normal range of motion and neck supple.      Right lower leg: No edema.      Left lower leg: No edema.   Lymphadenopathy:      Cervical: No cervical adenopathy.   Skin:     General: Skin is warm and dry.      Capillary Refill: Capillary refill takes less than 2 seconds.   Neurological:      General: No focal deficit present.      Mental Status: He is alert and oriented to person, place, and time. Mental status is at baseline.      Motor: Motor function is intact.      Gait: Gait is intact.   Psychiatric:         Attention and Perception: Attention normal.         Mood and Affect: Mood normal.         Speech: Speech normal.         Behavior: Behavior normal. Behavior is cooperative.          "    Significant Labs: All pertinent labs within the past 24 hours have been reviewed.  BMP:   Recent Labs   Lab 09/16/23  1522 09/17/23  0530   GLU  --  81   NA  --  140   K  --  3.2*   CL  --  103   CO2  --  31   BUN  --  10   CREATININE  --  1.19   CALCIUM  --  8.6   MG 1.9  --      CBC:   Recent Labs   Lab 09/16/23  1443 09/17/23  0530   WBC 6.89 5.54   HGB 12.1* 11.9*   HCT 37.1* 36.6*    257       Significant Imaging: I have reviewed all pertinent imaging results/findings within the past 24 hours.      Assessment/Plan:      * Intractable vomiting with nausea  09/16/2023:  Will hold Lodine due to can cause GI irritation. CT of abdomen and pelvis did not reveal an acute abdominal process, but did note 2 solid renal masses that will need further work up after discharge.   -1/2 NS 20 KCL at 100 ml/hr  -Zofran 4 mg IV prn nausea with vomiting  -Clear liquid diet, advance as tolerated  -Continue Pantoprazole  40 mg daily  -BMP pending    09/17/2023: Abdominal pain resolved. Continues to have nausea, but no vomiting since admission. Will advance to full liquid at breakfast today. If tolerating will continue to advance diet.     Bradycardia    09/17/2023: Patient has known bradycardia. Was scheduled for pacemaker this past week, but unable to go due to nausea with vomiting. Baseline EKG done. Patient will need to follow up appointment with cardiologist after discharge.    Diabetes mellitus, type 2  09/16/2023: Patient's FSGs are controlled on current medication regimen.  Last A1c reviewed- No results found for: "LABA1C", "HGBA1C"  Most recent fingerstick glucose reviewed- No results for input(s): "POCTGLUCOSE" in the last 24 hours.  Current correctional scale  Medium  Maintain anti-hyperglycemic dose as follows-   Antihyperglycemics (From admission, onward)    Start     Stop Route Frequency Ordered    09/16/23 1903  insulin aspart U-100 injection 0-10 Units         -- SubQ Before meals & nightly PRN 09/16/23 " 1804        Hold Oral hypoglycemics while patient is in the hospital.  Currently holding Amaryl and Metformin. Metformin need to be held for at least 48 hours due to having contrasted CT scan.   -Accuchecks AC & HS with moderate SSI coverage    09/17/2023: FBS 81 mg/dL this am. Will continue to monitor.    Hypokalemia    09/16/2023: Potassium down to 3.1. Mag level was 1.9. Patient has been unable to keep po potassium down due to nausea with vomiting. Will add potassium to IV fluids.  -1/2 NS 20 KCL at 100 ml/hr  -BMP    09/17/2023: Potassium up to 3.2.  -One time dose of potassium chl 20 meq po  -Continue 1/2 NS 20 KCL at 100 ml/hr  -BMP in am    Acute cystitis without hematuria  09/16/2023: Urine culture grew out >100,000 gram negative Bacilli. Sensitivity is pending. Bactrim DS stopped due to nausea and vomiting.  -Rocephin 1 gm IV every 24 hours (day 1 of 5)  -CBC in am    09/17/2023: Urine culture sensitivity pending    VTE Risk Mitigation (From admission, onward)         Ordered     apixaban tablet 5 mg  2 times daily         09/16/23 1804     IP VTE HIGH RISK PATIENT  Once         09/16/23 1804     Place NATAN hose  Until discontinued         09/16/23 1804     Place sequential compression device  Until discontinued         09/16/23 1804                Discharge Planning   KAY:      Code Status: Full Code   Is the patient medically ready for discharge?:     Reason for patient still in hospital (select all that apply): Laboratory test and Treatment         Discussed patient's case, labs and medications with Dr. Mckee. He agreed with current POC.           Whitley Webb, LOTTIEP  Department of Hospital Medicine   [facility] - Medical Surgical Unit

## 2023-09-17 NOTE — HPI
82 y/o AAM with PMHx: DM, HTN and BPH is admitted from [facility] ED for IV fluids and antibiotics for intractable nausea with vomiting and UTI. This was patient's second visit to the ED this week for nausea and vomiting that started on 09/10. He was seen here in the ED on 09/14, diagnosed with UTI and nausea with vomiting resolved. Nausea with vomiting symptoms returned this morning and patient has been unable to keep down liquids.   While in the ED patient received 1 liter of NS, IV Zofran 4 mg and Rocephin 1 gm IV. UA from 9/14 was WBC 11-15, RBC 5-10 with moderate Bacteria. Urine culture grew out >100,000 gram negative Bacilli, sensitivity pending. Labs today WBC 6.89, H&H 12.1/37.1, Na 138, K+ 3.1, BUN/CR 11/1.26, glucose 111 mg/dL, mag 1.9, lipase 62, lactate 1.1. CT abdomen/pelvis with contrast revealed no acute pathology within the abdomen or pelvis. Did note two separate possible solid left renal masses that was concerning for neoplasm.  Normal appendix.  No hydronephrosis or nephrolithiasis.  Prior cholecystectomy.  No available imaging to compare. Will need further evaluation and work up after discharge.       Patient was to have pacemaker placed on 9/14 at [facility], but did not have due to nausea and vomiting.   Patient is on Eliquis, but he is unsure why he takes it. He is followed by Dr. Lacey, cardiologist.

## 2023-09-17 NOTE — SUBJECTIVE & OBJECTIVE
Interval History: Epigastric pain resolved. Continues to have nausea, but no vomiting. Potassium up to 3.2 today. Urine culture sensitivity still pending.    Review of Systems   Constitutional:  Positive for appetite change (decreased). Negative for activity change, fatigue and fever.   HENT: Negative.  Negative for congestion, ear pain, sinus pressure, sinus pain, sore throat and trouble swallowing.    Eyes: Negative.  Negative for photophobia, pain, redness and visual disturbance.   Respiratory: Negative.  Negative for cough and shortness of breath.    Cardiovascular: Negative.  Negative for chest pain, palpitations and leg swelling.   Gastrointestinal:  Positive for nausea. Negative for abdominal pain (epigastric), constipation, diarrhea and vomiting.   Genitourinary: Negative.  Negative for dysuria, frequency and hematuria.   Musculoskeletal: Negative.  Negative for gait problem, neck pain and neck stiffness.   Skin: Negative.    Neurological:  Negative for dizziness, syncope, speech difficulty, weakness, light-headedness, numbness and headaches.   Hematological: Negative.    Psychiatric/Behavioral: Negative.       Objective:     Vital Signs (Most Recent):  Temp: 98.8 °F (37.1 °C) (09/17/23 0320)  Pulse: (!) 43 (09/17/23 0320)  Resp: 13 (09/17/23 0320)  BP: (!) 145/48 (09/17/23 0320)  SpO2: 97 % (09/17/23 0320) Vital Signs (24h Range):  Temp:  [98.1 °F (36.7 °C)-99.4 °F (37.4 °C)] 98.8 °F (37.1 °C)  Pulse:  [42-48] 43  Resp:  [10-19] 13  SpO2:  [95 %-98 %] 97 %  BP: (124-182)/() 145/48     Weight: 81 kg (178 lb 9.6 oz)  Body mass index is 24.91 kg/m².    Intake/Output Summary (Last 24 hours) at 9/17/2023 0739  Last data filed at 9/17/2023 0502  Gross per 24 hour   Intake 1938.33 ml   Output 450 ml   Net 1488.33 ml         Physical Exam  Vitals reviewed.   Constitutional:       General: He is awake.      Appearance: Normal appearance. He is well-developed. He is ill-appearing. He is not toxic-appearing or  diaphoretic.   HENT:      Head: Normocephalic and atraumatic.      Right Ear: Tympanic membrane, ear canal and external ear normal.      Left Ear: Tympanic membrane, ear canal and external ear normal.      Nose: Nose normal.      Mouth/Throat:      Mouth: Mucous membranes are dry.      Pharynx: Uvula midline.   Eyes:      Conjunctiva/sclera: Conjunctivae normal.      Pupils: Pupils are equal, round, and reactive to light.   Cardiovascular:      Rate and Rhythm: Regular rhythm. Bradycardia present.      Pulses: Normal pulses.   Pulmonary:      Effort: Pulmonary effort is normal.      Breath sounds: Normal breath sounds and air entry. No decreased breath sounds, wheezing, rhonchi or rales.   Abdominal:      General: Bowel sounds are normal.      Palpations: Abdomen is soft.      Tenderness: There is no abdominal tenderness. There is no right CVA tenderness, left CVA tenderness, guarding or rebound.   Musculoskeletal:      Cervical back: Full passive range of motion without pain, normal range of motion and neck supple.      Right lower leg: No edema.      Left lower leg: No edema.   Lymphadenopathy:      Cervical: No cervical adenopathy.   Skin:     General: Skin is warm and dry.      Capillary Refill: Capillary refill takes less than 2 seconds.   Neurological:      General: No focal deficit present.      Mental Status: He is alert and oriented to person, place, and time. Mental status is at baseline.      Motor: Motor function is intact.      Gait: Gait is intact.   Psychiatric:         Attention and Perception: Attention normal.         Mood and Affect: Mood normal.         Speech: Speech normal.         Behavior: Behavior normal. Behavior is cooperative.             Significant Labs: All pertinent labs within the past 24 hours have been reviewed.  BMP:   Recent Labs   Lab 09/16/23  1522 09/17/23  0530   GLU  --  81   NA  --  140   K  --  3.2*   CL  --  103   CO2  --  31   BUN  --  10   CREATININE  --  1.19   CALCIUM   --  8.6   MG 1.9  --      CBC:   Recent Labs   Lab 09/16/23  1443 09/17/23  0530   WBC 6.89 5.54   HGB 12.1* 11.9*   HCT 37.1* 36.6*    257       Significant Imaging: I have reviewed all pertinent imaging results/findings within the past 24 hours.

## 2023-09-17 NOTE — SUBJECTIVE & OBJECTIVE
Past Medical History:   Diagnosis Date    Anticoagulant long-term use     Diabetes mellitus     Hypertension        Past Surgical History:   Procedure Laterality Date    ABDOMINAL SURGERY      CHOLECYSTECTOMY         Review of patient's allergies indicates:   Allergen Reactions    Ciprofloxacin Itching       No current facility-administered medications on file prior to encounter.     Current Outpatient Medications on File Prior to Encounter   Medication Sig    apixaban (ELIQUIS) 5 mg Tab Take 5 mg by mouth 2 (two) times daily.    atorvastatin (LIPITOR) 40 MG tablet Take 40 mg by mouth once daily.    citalopram (CELEXA) 40 MG tablet Take 40 mg by mouth once daily.    diphenoxylate-atropine 2.5-0.025 mg (LOMOTIL) 2.5-0.025 mg per tablet Take 1 tablet by mouth 4 (four) times daily as needed for Diarrhea.    donepeziL (ARICEPT) 10 MG tablet Take 10 mg by mouth.    etodolac (LODINE) 400 MG tablet Take 400 mg by mouth 2 (two) times daily as needed.    gabapentin (NEURONTIN) 400 MG capsule Take 400 mg by mouth 2 (two) times daily.    glimepiride (AMARYL) 4 MG tablet Take 4 mg by mouth before breakfast. 2 tablets one time a day    irbesartan (AVAPRO) 150 MG tablet Take 150 mg by mouth every evening.    levocetirizine (XYZAL) 5 MG tablet 1 tablet in the evening Orally Once a day as needed for allergies for 30 days    metFORMIN (GLUCOPHAGE) 500 MG tablet Take 500 mg by mouth 2 (two) times daily with meals.    ondansetron (ZOFRAN) 4 MG tablet Take 1 tablet (4 mg total) by mouth every 6 (six) hours.    pantoprazole (PROTONIX) 40 MG tablet 1 tablet Orally Once a day for stomach for 90 days    potassium chloride (MICRO-K) 10 MEQ CpSR Take 10 mEq by mouth once.    sulfamethoxazole-trimethoprim 800-160mg (BACTRIM DS) 800-160 mg Tab Take 1 tablet by mouth 2 (two) times daily. for 10 days    tamsulosin (FLOMAX) 0.4 mg Cap Take by mouth once daily.    traZODone (DESYREL) 50 MG tablet Take 50 mg by mouth every evening.     Family  History    None       Tobacco Use    Smoking status: Never    Smokeless tobacco: Current     Types: Snuff   Substance and Sexual Activity    Alcohol use: Not Currently    Drug use: Never    Sexual activity: Not Currently     Review of Systems   Constitutional:  Positive for appetite change. Negative for activity change, fatigue and fever.   HENT: Negative.  Negative for congestion, ear pain, sinus pressure, sinus pain, sore throat and trouble swallowing.    Eyes: Negative.  Negative for photophobia, pain, redness and visual disturbance.   Respiratory: Negative.  Negative for cough and shortness of breath.    Cardiovascular: Negative.  Negative for chest pain, palpitations and leg swelling.   Gastrointestinal:  Positive for abdominal pain (epigastric), nausea and vomiting. Negative for constipation and diarrhea.   Genitourinary:  Positive for frequency. Negative for dysuria and hematuria.   Musculoskeletal: Negative.  Negative for gait problem, neck pain and neck stiffness.   Skin: Negative.    Neurological:  Negative for dizziness, syncope, speech difficulty, weakness, light-headedness, numbness and headaches.   Hematological: Negative.    Psychiatric/Behavioral: Negative.       Objective:     Vital Signs (Most Recent):  Temp: 98.8 °F (37.1 °C) (09/17/23 0320)  Pulse: (!) 43 (09/17/23 0320)  Resp: 13 (09/17/23 0320)  BP: (!) 145/48 (09/17/23 0320)  SpO2: 97 % (09/17/23 0320) Vital Signs (24h Range):  Temp:  [98.1 °F (36.7 °C)-99.4 °F (37.4 °C)] 98.8 °F (37.1 °C)  Pulse:  [42-48] 43  Resp:  [10-19] 13  SpO2:  [95 %-98 %] 97 %  BP: (124-182)/() 145/48     Weight: 81 kg (178 lb 9.6 oz)  Body mass index is 24.91 kg/m².     Physical Exam  Constitutional:       General: He is awake.      Appearance: Normal appearance. He is well-developed and well-groomed. He is ill-appearing. He is not toxic-appearing or diaphoretic.   HENT:      Head: Normocephalic and atraumatic.      Right Ear: Tympanic membrane, ear canal and  external ear normal.      Left Ear: Tympanic membrane, ear canal and external ear normal.      Nose: Nose normal.      Mouth/Throat:      Mouth: Mucous membranes are dry.      Pharynx: Uvula midline.   Eyes:      General: Lids are normal.      Extraocular Movements: Extraocular movements intact.      Conjunctiva/sclera: Conjunctivae normal.      Pupils: Pupils are equal, round, and reactive to light.   Cardiovascular:      Rate and Rhythm: Regular rhythm. Bradycardia present.      Pulses: Normal pulses.   Pulmonary:      Effort: Pulmonary effort is normal.      Breath sounds: Normal breath sounds and air entry. No decreased breath sounds, wheezing, rhonchi or rales.   Abdominal:      General: Bowel sounds are normal.      Palpations: Abdomen is soft.      Tenderness: There is abdominal tenderness in the epigastric area. There is no right CVA tenderness, left CVA tenderness, guarding or rebound.   Musculoskeletal:      Cervical back: Full passive range of motion without pain, normal range of motion and neck supple.      Right lower leg: No edema.      Left lower leg: No edema.   Lymphadenopathy:      Cervical: No cervical adenopathy.   Skin:     General: Skin is warm and dry.      Capillary Refill: Capillary refill takes less than 2 seconds.   Neurological:      Mental Status: He is alert and oriented to person, place, and time. Mental status is at baseline.      Cranial Nerves: Cranial nerves 2-12 are intact.      Sensory: Sensation is intact.      Motor: Motor function is intact.      Gait: Gait is intact.   Psychiatric:         Mood and Affect: Mood normal.         Speech: Speech normal.         Behavior: Behavior normal. Behavior is cooperative.         Thought Content: Thought content normal.              CRANIAL NERVES     CN III, IV, VI   Pupils are equal, round, and reactive to light.       Significant Labs: All pertinent labs within the past 24 hours have been reviewed.  Recent Lab Results          09/17/23  0530   09/16/23 2004 09/16/23  1522   09/16/23  1443        Albumin/Globulin Ratio       0.7       Albumin       3.1       Alkaline Phosphatase       106       ALT       19       Anion Gap 9       11       AST       31       Baso # 0.02       0.02       Basophil % 0.4       0.3       BILIRUBIN TOTAL       1.5       BUN 10       11       BUN/CREAT RATIO 8       9       Calcium 8.6       8.8       Chloride 103       98       CO2 31       32       Creatinine 1.19       1.26       Differential Method Auto       Auto       eGFR 61       57       Eos # 0.28       0.10       Eosinophil % 5.1       1.5       Globulin, Total       4.4       Glucose 81       111       Hematocrit 36.6       37.1       Hemoglobin 11.9       12.1       Lactate, Patel       1.1       Lipase       62       Lymph # 1.86       2.18       Lymph % 33.6       31.6       Magnesium      1.9         MCH 28.1       28.1       MCHC 32.5       32.6       MCV 86.5       86.3       Mono # 0.70       0.62       Mono % 12.6       9.0       MPV 10.5       10.9       Neutrophils, Abs 2.68       3.97       Neutrophils Relative 48.3       57.6       Platelets 257       287       POC Glucose   126           Potassium 3.2       3.1       PROTEIN TOTAL       7.5       RBC 4.23       4.30       RDW 13.2       13.2       Sodium 140       138       WBC 5.54       6.89               Significant Imaging: I have reviewed all pertinent imaging results/findings within the past 24 hours.

## 2023-09-17 NOTE — ASSESSMENT & PLAN NOTE
09/16/2023: Urine culture grew out >100,000 gram negative Bacilli. Sensitivity is pending. Bactrim DS stopped due to nausea and vomiting.  -Rocephin 1 gm IV every 24 hours (day 1 of 5)  -CBC in am

## 2023-09-17 NOTE — ASSESSMENT & PLAN NOTE
09/16/2023: Potassium down to 3.1. Mag level was 1.9. Patient has been unable to keep po potassium down due to nausea with vomiting. Will add potassium to IV fluids.  -1/2 NS 20 KCL at 100 ml/hr  -BMP    09/17/2023: Potassium up to 3.2.  -One time dose of potassium chl 20 meq po  -Continue 1/2 NS 20 KCL at 100 ml/hr  -BMP in am

## 2023-09-17 NOTE — ASSESSMENT & PLAN NOTE
"09/16/2023: Patient's FSGs are controlled on current medication regimen.  Last A1c reviewed- No results found for: "LABA1C", "HGBA1C"  Most recent fingerstick glucose reviewed- No results for input(s): "POCTGLUCOSE" in the last 24 hours.  Current correctional scale  Medium  Maintain anti-hyperglycemic dose as follows-   Antihyperglycemics (From admission, onward)    Start     Stop Route Frequency Ordered    09/16/23 1903  insulin aspart U-100 injection 0-10 Units         -- SubQ Before meals & nightly PRN 09/16/23 1804        Hold Oral hypoglycemics while patient is in the hospital.  Currently holding Amaryl and Metformin. Metformin need to be held for at least 48 hours due to having contrasted CT scan.   -Accuchecks AC & HS with moderate SSI coverage    09/17/2023: FBS 81 mg/dL this am. Will continue to monitor.  "

## 2023-09-17 NOTE — ASSESSMENT & PLAN NOTE
09/16/2023: Urine culture grew out >100,000 gram negative Bacilli. Sensitivity is pending. Bactrim DS stopped due to nausea and vomiting.  -Rocephin 1 gm IV every 24 hours (day 1 of 5)  -CBC in am    09/17/2023: Urine culture sensitivity pending

## 2023-09-18 VITALS
BODY MASS INDEX: 24.87 KG/M2 | SYSTOLIC BLOOD PRESSURE: 140 MMHG | TEMPERATURE: 98 F | OXYGEN SATURATION: 98 % | HEART RATE: 40 BPM | RESPIRATION RATE: 18 BRPM | WEIGHT: 177.63 LBS | HEIGHT: 71 IN | DIASTOLIC BLOOD PRESSURE: 54 MMHG

## 2023-09-18 PROBLEM — N39.0 URINARY TRACT INFECTION WITHOUT HEMATURIA: Status: ACTIVE | Noted: 2023-09-18

## 2023-09-18 PROBLEM — N28.89 RENAL MASS: Status: ACTIVE | Noted: 2023-09-18

## 2023-09-18 LAB
ANION GAP SERPL CALCULATED.3IONS-SCNC: 12 MMOL/L (ref 7–16)
BASOPHILS # BLD AUTO: 0.02 K/UL (ref 0–0.2)
BASOPHILS NFR BLD AUTO: 0.3 % (ref 0–1)
BUN SERPL-MCNC: 8 MG/DL (ref 7–18)
BUN/CREAT SERPL: 8 (ref 6–20)
CALCIUM SERPL-MCNC: 8.7 MG/DL (ref 8.5–10.1)
CHLORIDE SERPL-SCNC: 103 MMOL/L (ref 98–107)
CO2 SERPL-SCNC: 30 MMOL/L (ref 21–32)
CREAT SERPL-MCNC: 1 MG/DL (ref 0.7–1.3)
DIFFERENTIAL METHOD BLD: ABNORMAL
EGFR (NO RACE VARIABLE) (RUSH/TITUS): 76 ML/MIN/1.73M2
EOSINOPHIL # BLD AUTO: 0.51 K/UL (ref 0–0.5)
EOSINOPHIL NFR BLD AUTO: 8.1 % (ref 1–4)
ERYTHROCYTE [DISTWIDTH] IN BLOOD BY AUTOMATED COUNT: 13.4 % (ref 11.5–14.5)
GLUCOSE SERPL-MCNC: 126 MG/DL (ref 74–106)
GLUCOSE SERPL-MCNC: 283 MG/DL (ref 70–105)
HCT VFR BLD AUTO: 38.1 % (ref 40–54)
HGB BLD-MCNC: 12.4 G/DL (ref 13.5–18)
LYMPHOCYTES # BLD AUTO: 2.73 K/UL (ref 1–4.8)
LYMPHOCYTES NFR BLD AUTO: 43.1 % (ref 27–41)
MCH RBC QN AUTO: 28.1 PG (ref 27–31)
MCHC RBC AUTO-ENTMCNC: 32.5 G/DL (ref 32–36)
MCV RBC AUTO: 86.4 FL (ref 80–96)
MONOCYTES # BLD AUTO: 0.73 K/UL (ref 0–0.8)
MONOCYTES NFR BLD AUTO: 11.5 % (ref 2–6)
MPC BLD CALC-MCNC: 10.5 FL (ref 9.4–12.4)
NEUTROPHILS # BLD AUTO: 2.34 K/UL (ref 1.8–7.7)
NEUTROPHILS NFR BLD AUTO: 37 % (ref 53–65)
PLATELET # BLD AUTO: 275 K/UL (ref 150–400)
POTASSIUM SERPL-SCNC: 3.6 MMOL/L (ref 3.5–5.1)
RBC # BLD AUTO: 4.41 M/UL (ref 4.6–6.2)
SODIUM SERPL-SCNC: 141 MMOL/L (ref 136–145)
WBC # BLD AUTO: 6.33 K/UL (ref 4.5–11)

## 2023-09-18 PROCEDURE — 85025 COMPLETE CBC W/AUTO DIFF WBC: CPT | Performed by: NURSE PRACTITIONER

## 2023-09-18 PROCEDURE — 80048 BASIC METABOLIC PNL TOTAL CA: CPT | Performed by: NURSE PRACTITIONER

## 2023-09-18 PROCEDURE — 99239 PR HOSPITAL DISCHARGE DAY,>30 MIN: ICD-10-PCS | Mod: ,,, | Performed by: FAMILY MEDICINE

## 2023-09-18 PROCEDURE — 82962 GLUCOSE BLOOD TEST: CPT

## 2023-09-18 PROCEDURE — 25000003 PHARM REV CODE 250: Performed by: NURSE PRACTITIONER

## 2023-09-18 PROCEDURE — 96361 HYDRATE IV INFUSION ADD-ON: CPT

## 2023-09-18 PROCEDURE — G0378 HOSPITAL OBSERVATION PER HR: HCPCS

## 2023-09-18 PROCEDURE — 99239 HOSP IP/OBS DSCHRG MGMT >30: CPT | Mod: ,,, | Performed by: FAMILY MEDICINE

## 2023-09-18 PROCEDURE — 96372 THER/PROPH/DIAG INJ SC/IM: CPT | Performed by: NURSE PRACTITIONER

## 2023-09-18 PROCEDURE — 63600175 PHARM REV CODE 636 W HCPCS: Performed by: NURSE PRACTITIONER

## 2023-09-18 PROCEDURE — 25000003 PHARM REV CODE 250: Performed by: FAMILY MEDICINE

## 2023-09-18 PROCEDURE — 94761 N-INVAS EAR/PLS OXIMETRY MLT: CPT

## 2023-09-18 RX ORDER — CEFUROXIME AXETIL 250 MG/1
500 TABLET ORAL EVERY 12 HOURS
Status: DISCONTINUED | OUTPATIENT
Start: 2023-09-18 | End: 2023-09-18 | Stop reason: HOSPADM

## 2023-09-18 RX ORDER — CEFUROXIME AXETIL 500 MG/1
500 TABLET ORAL 2 TIMES DAILY
Qty: 20 TABLET | Refills: 0 | Status: SHIPPED | OUTPATIENT
Start: 2023-09-18

## 2023-09-18 RX ADMIN — TAMSULOSIN HYDROCHLORIDE 0.4 MG: 0.4 CAPSULE ORAL at 09:09

## 2023-09-18 RX ADMIN — CEFUROXIME AXETIL 500 MG: 250 TABLET, FILM COATED ORAL at 11:09

## 2023-09-18 RX ADMIN — CITALOPRAM HYDROBROMIDE 40 MG: 20 TABLET ORAL at 09:09

## 2023-09-18 RX ADMIN — GABAPENTIN 400 MG: 400 CAPSULE ORAL at 09:09

## 2023-09-18 RX ADMIN — INSULIN ASPART 6 UNITS: 100 INJECTION, SOLUTION INTRAVENOUS; SUBCUTANEOUS at 11:09

## 2023-09-18 RX ADMIN — LOSARTAN POTASSIUM 50 MG: 50 TABLET, FILM COATED ORAL at 09:09

## 2023-09-18 RX ADMIN — POTASSIUM CHLORIDE AND SODIUM CHLORIDE: 450; 150 INJECTION, SOLUTION INTRAVENOUS at 05:09

## 2023-09-18 RX ADMIN — APIXABAN 5 MG: 2.5 TABLET, FILM COATED ORAL at 09:09

## 2023-09-18 RX ADMIN — PANTOPRAZOLE SODIUM 40 MG: 40 TABLET, DELAYED RELEASE ORAL at 09:09

## 2023-09-18 RX ADMIN — ATORVASTATIN CALCIUM 40 MG: 40 TABLET, FILM COATED ORAL at 09:09

## 2023-09-18 NOTE — DISCHARGE INSTRUCTIONS
Followup as scheduled.return to er of any chest pain/palapations / shortness breath or other symptoms

## 2023-09-18 NOTE — PLAN OF CARE
Problem: Adult Inpatient Plan of Care  Goal: Plan of Care Review  Outcome: Met  Goal: Absence of Hospital-Acquired Illness or Injury  Outcome: Met  Goal: Readiness for Transition of Care  Outcome: Met     Problem: Diabetes Comorbidity  Goal: Blood Glucose Level Within Targeted Range  Outcome: Met     Problem: Fall Injury Risk  Goal: Absence of Fall and Fall-Related Injury  Outcome: Met

## 2023-09-18 NOTE — ASSESSMENT & PLAN NOTE
Patient's nausea vomiting has resolved he is able to tolerate a regular diet.  Will discharge him today.  Labs today show electrolytes are normal with a glucose of 126 BUN of 8 creatinine of 1.0.

## 2023-09-18 NOTE — ASSESSMENT & PLAN NOTE
Left renal masses x2 seen on CT scan incidentally.  Will refer to urologist who he is needs anyway because of his recurrent UTIs and BPH

## 2023-09-18 NOTE — DISCHARGE SUMMARY
[facility] - Medical Surgical Unit  Hospital Medicine  Discharge Summary      Patient Name: Rebel Fisher  MRN: [id number]  SHIRA: [id number]  Patient Class: OP- Observation  Admission Date: 9/16/2023  Hospital Length of Stay: 0 days  Discharge Date and Time:  09/18/2023 11:44 AM  Attending Physician: Tin Saucedo DO   Discharging Provider: Tin Saucedo DO  Primary Care Provider: Meron Higgins MD    Primary Care Team: Networked reference to record PCT     HPI:   84 y/o AAM with PMHx: DM, HTN and BPH is admitted from [facility] ED for IV fluids and antibiotics for intractable nausea with vomiting and UTI. This was patient's second visit to the ED this week for nausea and vomiting that started on 09/10. He was seen here in the ED on 09/14, diagnosed with UTI and nausea with vomiting resolved. Nausea with vomiting symptoms returned this morning and patient has been unable to keep down liquids.   While in the ED patient received 1 liter of NS, IV Zofran 4 mg and Rocephin 1 gm IV. UA from 9/14 was WBC 11-15, RBC 5-10 with moderate Bacteria. Urine culture grew out >100,000 gram negative Bacilli, sensitivity pending. Labs today WBC 6.89, H&H 12.1/37.1, Na 138, K+ 3.1, BUN/CR 11/1.26, glucose 111 mg/dL, mag 1.9, lipase 62, lactate 1.1. CT abdomen/pelvis with contrast revealed no acute pathology within the abdomen or pelvis. Did note two separate possible solid left renal masses that was concerning for neoplasm.  Normal appendix.  No hydronephrosis or nephrolithiasis.  Prior cholecystectomy.  No available imaging to compare. Will need further evaluation and work up after discharge.       Patient was to have pacemaker placed on 9/14 at [facility], but did not have due to nausea and vomiting.   Patient is on Eliquis, but he is unsure why he takes it. He is followed by Dr. Lacey, cardiologist.      * No surgery found *      Hospital Course:   09/17/2023: Patient is hospital day #2. Continues to  received 1/2 NS 20 KCL at 100 ml/hr and Rocephin 1 gm IV for nausea with vomiting and UTI. Abdominal pain has resolved. Continues to have nausea, but has not had vomiting since admission. Labs today WBC 5.54, H&H 11.9/36.3, Na 140, K+ up to 3.2, BUN/CR improved to 10/1.19, glucose 81 mg/dL and urine culture sensitivity still pending. No new complaints voiced today.    9--patient without any nausea vomiting today.  He is tolerating liquids well.  Patient CT scan did show mass on his left renal area.  No other abnormalities.  Patient's blood sugars remained in the 200 range.  His creatinine has returned to normal with his electrolytes normal in his BUN of a creatinine 1.00.  Patient does consistently have a low heart rate and is scheduled for pacemaker.  We have contacted [facility] and has appt on 9- with Dr Lacey.  Will need a urology consultation for 2 small renal masses on the left on CT scan.  Patient is to resume his regular diabetic diet and regular medicines at home.  Patient does have urinary tract infection which is sensitive to Ceftin.  Patient does have a history of BPH and is already followed by Urology.  Patient discharged in stable condition.       Goals of Care Treatment Preferences:  Code Status: Full Code      Consults:   Consults (From admission, onward)        Status Ordering Provider     Inpatient consult to Registered Dietitian/Nutritionist  Once        Provider:  (Not yet assigned)    Acknowledged LESLYE MARTINEZ          Cardiac/Vascular  Bradycardia    Patient has seen Dr. Kaylen GAMA for his bradycardia in the past.  Discussed this with their office today and he has an appointment to be seen on the 20/6 of September 2023 or a possible pacemaker    Renal/  Renal mass  Left renal masses x2 seen on CT scan incidentally.  Will refer to urologist who he is needs anyway because of his recurrent UTIs and BPH      Acute cystitis without hematuria  Urine culture grew out Klebsiella  pneumoniae that has helped Roxicodone which he had been on in the hospital but also sensitive to in so will start him on that as an outpatient 500 mg twice daily.  He is to follow-up with Urology ER his primary care doctor within 1 week.    Endocrine  Diabetes mellitus, type 2  Blood sugars have been controlled however because he is rehydrated now will start him back on his oral hypoglycemics in his been over 48 hours since he had contrast so we can restart his metformin.    GI  * Intractable vomiting with nausea  Patient's nausea vomiting has resolved he is able to tolerate a regular diet.  Will discharge him today.  Labs today show electrolytes are normal with a glucose of 126 BUN of 8 creatinine of 1.0.    Final Active Diagnoses:    Diagnosis Date Noted POA    PRINCIPAL PROBLEM:  Intractable vomiting with nausea [R11.2] 09/16/2023 Yes    Renal mass [N28.89] 09/18/2023 Yes    Hypokalemia [E87.6] 09/17/2023 Yes    Diabetes mellitus, type 2 [E11.9] 09/17/2023 Yes    Bradycardia [R00.1] 09/17/2023 Yes    Acute cystitis without hematuria [N30.00] 09/16/2023 Yes      Problems Resolved During this Admission:       Discharged Condition: stable    Disposition: Home or Self Care    Follow Up:    Patient Instructions:      Ambulatory referral/consult to Cardiology   Standing Status: Future   Referral Priority: Routine Referral Type: Consultation   Referral Reason: Specialty Services Required   Requested Specialty: Cardiology   Number of Visits Requested: 1     Ambulatory referral/consult to Urology   Standing Status: Future   Referral Priority: Routine Referral Type: Consultation   Referral Reason: Specialty Services Required   Requested Specialty: Urology   Number of Visits Requested: 1     Diet diabetic     Activity as tolerated       Significant Diagnostic Studies: Labs:   BMP:   Recent Labs   Lab 09/16/23  1443 09/16/23  1522 09/17/23  0530 09/18/23  0515   *  --  81 126*     --  140 141   K 3.1*  --   "3.2* 3.6   CL 98  --  103 103   CO2 32  --  31 30   BUN 11  --  10 8   CREATININE 1.26  --  1.19 1.00   CALCIUM 8.8  --  8.6 8.7   MG  --  1.9  --   --    , CMP   Recent Labs   Lab 09/16/23  1443 09/17/23  0530 09/18/23  0515    140 141   K 3.1* 3.2* 3.6   CL 98 103 103   CO2 32 31 30   * 81 126*   BUN 11 10 8   CREATININE 1.26 1.19 1.00   CALCIUM 8.8 8.6 8.7   PROT 7.5  --   --    ALBUMIN 3.1*  --   --    BILITOT 1.5*  --   --    ALKPHOS 106  --   --    AST 31  --   --    ALT 19  --   --    ANIONGAP 11 9 12   , CBC   Recent Labs   Lab 09/16/23  1443 09/17/23  0530 09/18/23  0515   WBC 6.89 5.54 6.33   HGB 12.1* 11.9* 12.4*   HCT 37.1* 36.6* 38.1*    257 275   , A1C:   Recent Labs   Lab 09/17/23  0530   HGBA1C 7.7*    and All labs within the past 24 hours have been reviewed  Microbiology:   Blood Culture No results found for: "LABBLOO" and Urine Culture    Lab Results   Component Value Date    LABURIN >100,000 Klebsiella pneumoniae (A) 09/14/2023     Radiology: CT scan: CT ABDOMEN PELVIS WITH CONTRAST:   Results for orders placed or performed during the hospital encounter of 09/16/23   CT Abdomen Pelvis With Contrast    Narrative    EXAMINATION  CT ABDOMEN PELVIS WITH CONTRAST    CLINICAL HISTORY:  Nausea/vomiting;Abdominal pain, acute, nonlocalized;    TECHNIQUE:  3 mm axial images were obtained from the lung bases through the ischial tuberosities status post administration of 100 cc of Isovue-370. No immediate complication from administration of contrast media. Coronal and sagittal reformatted images were additionally created and submitted for review. Total DLP: 744 mGy/cm.    Dose reduction:    The CT exam was performed using one or more dose reduction techniques: Automatic exposure control, automated adjustment of the MA and/or kVP according to patient size, or use of iterative reconstruction technique.    COMPARISON:  Abdomen radiograph 07/19/2023    FINDINGS:  Lung bases: Lung bases are " predominantly clear.  No significant pleural/pericardial effusion.    Abdomen:    Liver and Gallbladder: No abnormal enhancement.  Cholecystectomy clips noted.  No biliary ductal dilatation.  Portal vein is patent.    Spleen: Unremarkable    Pancreas: Unremarkable    Adrenals: Unremarkable    Kidneys: Both kidneys are equally perfused and demonstrate no evidence for obstructive uropathy. Solid-appearing 2.3 cm left endophytic renal lesion midpole kidney and similar possible solid exophytic 1 cm left renal lesion on image 68 are noted.  No prior imaging is available for comparison purposes.    Bowel and Mesentery: Small bowel is nondilated.  Small hiatal hernia.  No free fluid/air within the abdomen or pelvis.  Scattered fecal material is noted throughout the colon which is otherwise grossly unremarkable in course/caliber.  No significant mesenteric adenopathy.  The appendix is normal.    Retroperitoneum: No enlarged lymph nodes.    Pelvis:    Bladder: Unremarkable    Fluid: No free fluid identified.    Lymph nodes: No enlarged lymph nodes.    Pelvic organs: Unremarkable    Osseous structures: No suspicious appearing osseous abnormalities noted.      Impression    1. No evidence to suggest acute pathology within the abdomen or pelvis.    2.  Two separate possible solid left renal masses, measuring up to 2.3 cm, concerning for neoplasm.  Consider tissue sampling when clinically feasible.  Comparison to any prior imaging would also be helpful if can be made available.    3.  Normal appendix.  No hydronephrosis or nephrolithiasis.  Prior cholecystectomy.    Place of service: [facility]      Electronically signed by: Lonnie Dawkins  Date:    09/16/2023  Time:    17:28       Pending Diagnostic Studies:     None         Medications:  Reconciled Home Medications:      Medication List      START taking these medications    cefUROXime 500 MG tablet  Commonly known as: CEFTIN  Take 1 tablet (500 mg total) by mouth 2 (two)  times daily.        CONTINUE taking these medications    atorvastatin 40 MG tablet  Commonly known as: LIPITOR  Take 40 mg by mouth once daily.     citalopram 40 MG tablet  Commonly known as: CeleXA  Take 40 mg by mouth once daily.     donepeziL 10 MG tablet  Commonly known as: ARICEPT  Take 10 mg by mouth.     ELIQUIS 5 mg Tab  Generic drug: apixaban  Take 5 mg by mouth 2 (two) times daily.     etodolac 400 MG tablet  Commonly known as: LODINE  Take 400 mg by mouth 2 (two) times daily as needed.     gabapentin 400 MG capsule  Commonly known as: NEURONTIN  Take 400 mg by mouth 2 (two) times daily.     glimepiride 4 MG tablet  Commonly known as: AMARYL  Take 4 mg by mouth before breakfast. 2 tablets one time a day     irbesartan 150 MG tablet  Commonly known as: AVAPRO  Take 150 mg by mouth every evening.     levocetirizine 5 MG tablet  Commonly known as: XYZAL  1 tablet in the evening Orally Once a day as needed for allergies for 30 days     metFORMIN 500 MG tablet  Commonly known as: GLUCOPHAGE  Take 500 mg by mouth 2 (two) times daily with meals.     ondansetron 4 MG tablet  Commonly known as: ZOFRAN  Take 1 tablet (4 mg total) by mouth every 6 (six) hours.     pantoprazole 40 MG tablet  Commonly known as: PROTONIX  1 tablet Orally Once a day for stomach for 90 days     potassium chloride 10 MEQ Cpsr  Commonly known as: MICRO-K  Take 10 mEq by mouth once.     tamsulosin 0.4 mg Cap  Commonly known as: FLOMAX  Take by mouth once daily.     traZODone 50 MG tablet  Commonly known as: DESYREL  Take 50 mg by mouth every evening.        STOP taking these medications    diphenoxylate-atropine 2.5-0.025 mg 2.5-0.025 mg per tablet  Commonly known as: LOMOTIL     sulfamethoxazole-trimethoprim 800-160mg 800-160 mg Tab  Commonly known as: BACTRIM DS            Indwelling Lines/Drains at time of discharge:   Lines/Drains/Airways     None                 Time spent on the discharge  35 minutes         Tin Saucedo,  DO  Department of Hospital Medicine  [facility] - [facility] Surgical Unit

## 2023-09-18 NOTE — ASSESSMENT & PLAN NOTE
Patient has seen Dr. Kaylen GAMA for his bradycardia in the past.  Discussed this with their office today and he has an appointment to be seen on the 20/6 of September 2023 or a possible pacemaker

## 2023-09-18 NOTE — ASSESSMENT & PLAN NOTE
Blood sugars have been controlled however because he is rehydrated now will start him back on his oral hypoglycemics in his been over 48 hours since he had contrast so we can restart his metformin.

## 2023-09-18 NOTE — ASSESSMENT & PLAN NOTE
Urine culture grew out Klebsiella pneumoniae that has helped Roxicodone which he had been on in the hospital but also sensitive to in so will start him on that as an outpatient 500 mg twice daily.  He is to follow-up with Urology ER his primary care doctor within 1 week.

## 2023-12-25 PROBLEM — N39.0 URINARY TRACT INFECTION WITHOUT HEMATURIA: Status: RESOLVED | Noted: 2023-09-18 | Resolved: 2023-12-25

## 2024-07-29 ENCOUNTER — HOSPITAL ENCOUNTER (EMERGENCY)
Facility: HOSPITAL | Age: 84
Discharge: HOME | End: 2024-07-29
Payer: MEDICARE

## 2024-07-29 VITALS
WEIGHT: 184 LBS | TEMPERATURE: 98 F | BODY MASS INDEX: 25.76 KG/M2 | SYSTOLIC BLOOD PRESSURE: 191 MMHG | HEART RATE: 65 BPM | DIASTOLIC BLOOD PRESSURE: 81 MMHG | RESPIRATION RATE: 18 BRPM | OXYGEN SATURATION: 97 % | HEIGHT: 71 IN

## 2024-07-29 DIAGNOSIS — M54.32 SCIATICA OF LEFT SIDE: Primary | ICD-10-CM

## 2024-07-29 PROCEDURE — 63600175 PHARM REV CODE 636 W HCPCS: Performed by: NURSE PRACTITIONER

## 2024-07-29 PROCEDURE — 96372 THER/PROPH/DIAG INJ SC/IM: CPT | Performed by: NURSE PRACTITIONER

## 2024-07-29 PROCEDURE — 99284 EMERGENCY DEPT VISIT MOD MDM: CPT | Mod: GF | Performed by: NURSE PRACTITIONER

## 2024-07-29 PROCEDURE — 99284 EMERGENCY DEPT VISIT MOD MDM: CPT | Mod: 25

## 2024-07-29 RX ORDER — DEXAMETHASONE SODIUM PHOSPHATE 4 MG/ML
4 INJECTION, SOLUTION INTRA-ARTICULAR; INTRALESIONAL; INTRAMUSCULAR; INTRAVENOUS; SOFT TISSUE
Status: COMPLETED | OUTPATIENT
Start: 2024-07-29 | End: 2024-07-29

## 2024-07-29 RX ADMIN — DEXAMETHASONE SODIUM PHOSPHATE 4 MG: 4 INJECTION, SOLUTION INTRA-ARTICULAR; INTRALESIONAL; INTRAMUSCULAR; INTRAVENOUS; SOFT TISSUE at 09:07

## 2024-07-29 NOTE — ED PROVIDER NOTES
Encounter Date: 7/29/2024       History     Chief Complaint   Patient presents with    Hip Pain     Patient presents today with complaint of pain in left lower back, hip that radiates down his left lower extremity.  He has had no numbness in groin area, urinary retention or fecal incontinence.  He reports pain began 3 days ago and has been fairly consistent.  Does improve with rest and is worse with laying down for too long, sitting too long or standing too long        Review of patient's allergies indicates:   Allergen Reactions    Ciprofloxacin Itching     Past Medical History:   Diagnosis Date    Anticoagulant long-term use     Diabetes mellitus     Hypertension      Past Surgical History:   Procedure Laterality Date    ABDOMINAL SURGERY      CHOLECYSTECTOMY       No family history on file.  Social History     Tobacco Use    Smoking status: Never    Smokeless tobacco: Current     Types: Snuff   Substance Use Topics    Alcohol use: Not Currently    Drug use: Never     Review of Systems   Constitutional: Negative.    Respiratory: Negative.     Cardiovascular: Negative.    All other systems reviewed and are negative.      Physical Exam     Initial Vitals [07/29/24 0856]   BP Pulse Resp Temp SpO2   (!) 191/81 65 18 98.4 °F (36.9 °C) 97 %      MAP       --         Physical Exam    Nursing note and vitals reviewed.  Constitutional: He appears well-developed and well-nourished.   Neck:   Normal range of motion.  Cardiovascular:  Normal rate, regular rhythm and normal heart sounds.           No murmur heard.  Pulmonary/Chest: Breath sounds normal. No respiratory distress.   Musculoskeletal:      Cervical back: Normal range of motion.      Comments: Paralumbar tenderness on the left     Neurological: He is alert and oriented to person, place, and time. No cranial nerve deficit.   Skin: Skin is warm and dry.   Psychiatric: He has a normal mood and affect.         Medical Screening Exam   See Full Note    ED Course    Procedures  Labs Reviewed - No data to display       Imaging Results    None          Medications   dexAMETHasone injection 4 mg (has no administration in time range)     Medical Decision Making  Risk  Prescription drug management.               ED Course as of 07/29/24 0912 Mon Jul 29, 2024 0906 Patient with classic sciatic symptoms with left lower back pain that radiates down his left lower extremity.  Pain is worse with movement.  He reports the pain has been there for about 3 days.  He has no prior history.  Patient is on Eliquis so we will have to avoid anti-inflammatories.  We will give him 1 shot of Decadron and have him follow up with his primary care provider in 2-3 days.  He is a diabetic I have discussed the need to watch his blood sugars and do it here strictly to his diet for the next week. [BC]      ED Course User Index  [BC] Rodo Barnes NP                           Clinical Impression:   Final diagnoses:  [M54.32] Sciatica of left side (Primary)        ED Disposition Condition    Discharge Stable          ED Prescriptions    None       Follow-up Information       Follow up With Specialties Details Why Contact Info    Meron Higgins MD Internal Medicine In 2 days For follow-up of your left hip and leg pain [address]  [facility] [place] [address]  [phone]               Rodo Barnes NP  07/29/24 0912

## 2024-07-29 NOTE — DISCHARGE INSTRUCTIONS
Return to the emergency department for any worsening of condition, numbness in groin area, difficulty urinating or fecal incontinence.  Maintain a strict adherence to your diabetic diet as your blood sugar may be elevated after receiving the shot of steroid here in the emergency department.

## 2024-07-29 NOTE — ED TRIAGE NOTES
HERE FOR LEFT HIP PAIN RADIATING DOWN LET LEG.  DENIES INJURY OR FALL.  DENIES NUMBNESS OR TINGLING IN EXT.  AMB TO ER WITH CANE.  STATES PAIN KEPT HIM UP ALL NIGHT.

## 2024-07-31 ENCOUNTER — HOSPITAL ENCOUNTER (EMERGENCY)
Facility: HOSPITAL | Age: 84
Discharge: HOME | End: 2024-07-31
Payer: MEDICARE

## 2024-07-31 ENCOUNTER — TELEPHONE (OUTPATIENT)
Dept: EMERGENCY MEDICINE | Facility: HOSPITAL | Age: 84
End: 2024-07-31
Payer: MEDICARE

## 2024-07-31 VITALS
TEMPERATURE: 98 F | WEIGHT: 184 LBS | DIASTOLIC BLOOD PRESSURE: 90 MMHG | HEART RATE: 64 BPM | HEIGHT: 71 IN | BODY MASS INDEX: 25.76 KG/M2 | OXYGEN SATURATION: 98 % | RESPIRATION RATE: 18 BRPM | SYSTOLIC BLOOD PRESSURE: 169 MMHG

## 2024-07-31 DIAGNOSIS — M54.42 LEFT-SIDED LOW BACK PAIN WITH LEFT-SIDED SCIATICA, UNSPECIFIED CHRONICITY: Primary | ICD-10-CM

## 2024-07-31 PROCEDURE — 99285 EMERGENCY DEPT VISIT HI MDM: CPT | Mod: 25

## 2024-07-31 PROCEDURE — 96372 THER/PROPH/DIAG INJ SC/IM: CPT | Performed by: NURSE PRACTITIONER

## 2024-07-31 PROCEDURE — 63600175 PHARM REV CODE 636 W HCPCS: Performed by: NURSE PRACTITIONER

## 2024-07-31 PROCEDURE — 99284 EMERGENCY DEPT VISIT MOD MDM: CPT | Mod: GF | Performed by: NURSE PRACTITIONER

## 2024-07-31 RX ORDER — CYCLOBENZAPRINE HCL 10 MG
10 TABLET ORAL 3 TIMES DAILY PRN
Qty: 21 TABLET | Refills: 0 | Status: SHIPPED | OUTPATIENT
Start: 2024-07-31 | End: 2024-08-07

## 2024-07-31 RX ORDER — MORPHINE SULFATE 4 MG/ML
2 INJECTION, SOLUTION INTRAMUSCULAR; INTRAVENOUS
Status: COMPLETED | OUTPATIENT
Start: 2024-07-31 | End: 2024-07-31

## 2024-07-31 RX ORDER — ONDANSETRON HYDROCHLORIDE 2 MG/ML
4 INJECTION, SOLUTION INTRAVENOUS
Status: COMPLETED | OUTPATIENT
Start: 2024-07-31 | End: 2024-07-31

## 2024-07-31 RX ADMIN — ONDANSETRON 4 MG: 2 INJECTION INTRAMUSCULAR; INTRAVENOUS at 04:07

## 2024-07-31 RX ADMIN — MORPHINE SULFATE 2 MG: 4 INJECTION, SOLUTION INTRAMUSCULAR; INTRAVENOUS at 04:07

## 2025-01-14 ENCOUNTER — HOSPITAL ENCOUNTER (EMERGENCY)
Facility: HOSPITAL | Age: 85
Discharge: HOME | End: 2025-01-14
Payer: MEDICARE

## 2025-01-14 VITALS
OXYGEN SATURATION: 97 % | RESPIRATION RATE: 18 BRPM | DIASTOLIC BLOOD PRESSURE: 60 MMHG | HEART RATE: 60 BPM | BODY MASS INDEX: 27.02 KG/M2 | SYSTOLIC BLOOD PRESSURE: 170 MMHG | WEIGHT: 193 LBS | HEIGHT: 71 IN | TEMPERATURE: 98 F

## 2025-01-14 DIAGNOSIS — M54.40 CHRONIC LOW BACK PAIN WITH SCIATICA, SCIATICA LATERALITY UNSPECIFIED, UNSPECIFIED BACK PAIN LATERALITY: Primary | ICD-10-CM

## 2025-01-14 DIAGNOSIS — G89.29 CHRONIC LOW BACK PAIN WITH SCIATICA, SCIATICA LATERALITY UNSPECIFIED, UNSPECIFIED BACK PAIN LATERALITY: Primary | ICD-10-CM

## 2025-01-14 PROCEDURE — 99284 EMERGENCY DEPT VISIT MOD MDM: CPT | Mod: 25

## 2025-01-14 PROCEDURE — 96372 THER/PROPH/DIAG INJ SC/IM: CPT

## 2025-01-14 PROCEDURE — 63600175 PHARM REV CODE 636 W HCPCS

## 2025-01-14 PROCEDURE — 99284 EMERGENCY DEPT VISIT MOD MDM: CPT | Mod: GF

## 2025-01-14 RX ORDER — DEXAMETHASONE SODIUM PHOSPHATE 4 MG/ML
4 INJECTION, SOLUTION INTRA-ARTICULAR; INTRALESIONAL; INTRAMUSCULAR; INTRAVENOUS; SOFT TISSUE
Status: COMPLETED | OUTPATIENT
Start: 2025-01-14 | End: 2025-01-14

## 2025-01-14 RX ORDER — MORPHINE SULFATE 4 MG/ML
4 INJECTION, SOLUTION INTRAMUSCULAR; INTRAVENOUS
Status: COMPLETED | OUTPATIENT
Start: 2025-01-14 | End: 2025-01-14

## 2025-01-14 RX ORDER — ONDANSETRON HYDROCHLORIDE 2 MG/ML
4 INJECTION, SOLUTION INTRAVENOUS
Status: COMPLETED | OUTPATIENT
Start: 2025-01-14 | End: 2025-01-14

## 2025-01-14 RX ADMIN — DEXAMETHASONE SODIUM PHOSPHATE 4 MG: 4 INJECTION, SOLUTION INTRA-ARTICULAR; INTRALESIONAL; INTRAMUSCULAR; INTRAVENOUS; SOFT TISSUE at 04:01

## 2025-01-14 RX ADMIN — ONDANSETRON 4 MG: 2 INJECTION INTRAMUSCULAR; INTRAVENOUS at 04:01

## 2025-01-14 RX ADMIN — MORPHINE SULFATE 4 MG: 4 INJECTION, SOLUTION INTRAMUSCULAR; INTRAVENOUS at 04:01

## 2025-01-14 NOTE — ED PROVIDER NOTES
Encounter Date: 1/14/2025       History     Chief Complaint   Patient presents with    Back Pain     AE is a 85 y/o AAM with a PMHx significant for DM and HTN presents to the ED POV with c/o left lower back and left leg pain. Patient has a PMHx of sciatica. Patient stated that his current symptoms have been ongoing for the past several days. Patient has been taking Norco at home but is out, patient's pcp si working to get him scheduled with physical therapy.         Review of patient's allergies indicates:   Allergen Reactions    Ciprofloxacin Itching     Past Medical History:   Diagnosis Date    Anticoagulant long-term use     Diabetes mellitus     Hypertension      Past Surgical History:   Procedure Laterality Date    ABDOMINAL SURGERY      CHOLECYSTECTOMY       No family history on file.  Social History     Tobacco Use    Smoking status: Never    Smokeless tobacco: Current     Types: Snuff   Substance Use Topics    Alcohol use: Not Currently    Drug use: Never     Review of Systems   Constitutional: Negative.    HENT: Negative.     Eyes: Negative.    Respiratory: Negative.     Cardiovascular: Negative.    Gastrointestinal: Negative.    Endocrine: Negative.    Genitourinary: Negative.    Musculoskeletal:  Positive for back pain and myalgias.   Skin: Negative.    Allergic/Immunologic: Negative.    Neurological: Negative.    Hematological: Negative.    Psychiatric/Behavioral: Negative.         Physical Exam     Initial Vitals [01/14/25 1616]   BP Pulse Resp Temp SpO2   (!) 199/97 63 16 98 °F (36.7 °C) 98 %      MAP       --         Physical Exam    Nursing note and vitals reviewed.  Constitutional: Vital signs are normal. He appears well-developed and well-nourished. He is not diaphoretic. He is cooperative.  Non-toxic appearance. He does not have a sickly appearance. He does not appear ill. No distress.   Cardiovascular:  Normal rate, regular rhythm, S1 normal, S2 normal, normal heart sounds, intact distal pulses  and normal pulses.           Pulmonary/Chest: Effort normal and breath sounds normal.   Musculoskeletal:      Lumbar back: Tenderness present. No swelling, edema, deformity, signs of trauma, lacerations, spasms or bony tenderness. Decreased range of motion. Positive left straight leg raise test. No scoliosis.     Lymphadenopathy:     He has no cervical adenopathy.     He has no axillary adenopathy.   Neurological: He is alert and oriented to person, place, and time. He has normal strength and normal reflexes. He displays normal reflexes. No cranial nerve deficit or sensory deficit. He displays a negative Romberg sign. GCS eye subscore is 4. GCS verbal subscore is 5. GCS motor subscore is 6.   Skin: Skin is warm, dry and intact. Capillary refill takes less than 2 seconds. No rash noted.   Psychiatric: He has a normal mood and affect. His speech is normal and behavior is normal. Judgment and thought content normal. Cognition and memory are normal.         Medical Screening Exam   See Full Note    ED Course   Procedures  Labs Reviewed - No data to display       Imaging Results    None          Medications   morphine injection 4 mg (4 mg Intramuscular Given 1/14/25 1622)   ondansetron injection 4 mg (4 mg Intramuscular Given 1/14/25 1622)   dexAMETHasone injection 4 mg (4 mg Intramuscular Given 1/14/25 1622)     Medical Decision Making  AE is a 85 y/o AAM with a PMHx significant for DM and HTN presents to the ED POV with c/o left lower back and left leg pain. Patient has a PMHx of sciatica. Patient stated that his current symptoms have been ongoing for the past several days. Patient has been taking Norco at home but is out, patient's pcp si working to get him scheduled with physical therapy.           Amount and/or Complexity of Data Reviewed  Discussion of management or test interpretation with external provider(s): - Morphine IM  - Zofran IM  - Norflex IM    Risk  Prescription drug management.  Risk Details: Chronic  left lower back pain with sciatica                ED Course as of 01/14/25 1627 Tue Jan 14, 2025   1623 Discharge instructions given along with strict return precautions, patient verbalizes understanding.   [AC]      ED Course User Index  [AC] Rebel Callahan FNP                           Clinical Impression:   Final diagnoses:  [M54.40, G89.29] Chronic low back pain with sciatica, sciatica laterality unspecified, unspecified back pain laterality (Primary)        ED Disposition Condition    Discharge Stable          ED Prescriptions    None       Follow-up Information       Follow up With Specialties Details Why Contact Info    Meron Higgins MD Internal Medicine  local pcp [address]  [facility] [place] [address]  [phone]               Rebel Callahan FNP  01/14/25 1627

## 2025-01-15 ENCOUNTER — TELEPHONE (OUTPATIENT)
Dept: EMERGENCY MEDICINE | Facility: HOSPITAL | Age: 85
End: 2025-01-15
Payer: MEDICARE

## 2025-01-17 ENCOUNTER — PATIENT OUTREACH (OUTPATIENT)
Facility: OTHER | Age: 85
End: 2025-01-17

## 2025-01-21 ENCOUNTER — PATIENT OUTREACH (OUTPATIENT)
Facility: OTHER | Age: 85
End: 2025-01-21

## 2025-01-21 NOTE — PROGRESS NOTES
Karoline Claros LPN  ED Navigator  Emergency Department    Project: [facility] ED Navigator  Role: Community Health Worker    Date: 01/21/2025  Patient Name: Rebel Fisher  MRN: [id number]  PCP: Meron Higgins MD    Assessment:     Rebel Fisher is a 84 y.o. male who has presented to ED for back pain. Patient has visited the ED 1 times in the past 3 months. Patient did contact PCP.     ED Navigator Initial Assessment    ED Navigator Enrollment Documentation  Consent to Services  Does patient consent to completing the assessment?: Yes  Contact  Method of Initial Contact: Phone  Transportation  Insurance Coverage  Do you have coverage/adequate coverage?: Yes  Reason for no/inadequate coverage: Patient declined assistance with insurance coverage  Specialist Appointment  Did the patient come to the ED to see a specialist?: No  Does the patient have a pending specialist referral?: No  Does the patient have a specialist appointment made?: No  PCP Follow Up Appointment  Medications  Is patient able to afford medication?: Yes  Psychological  Food  Communication/Education  Other Financial Concerns  Other Social Barriers/Concerns  Primary Barrier  Plan: Provided information for [facility] 24/7 Nurse triage line, [phone] or [phone]-[facility] ([phone])         Social History     Socioeconomic History    Marital status:    Tobacco Use    Smoking status: Never    Smokeless tobacco: Current     Types: Snuff   Substance and Sexual Activity    Alcohol use: Not Currently    Drug use: Never    Sexual activity: Not Currently     Social Drivers of Health     Financial Resource Strain: Low Risk  (1/21/2025)    Overall Financial Resource Strain (CARDIA)     Difficulty of Paying Living Expenses: Not hard at all   Food Insecurity: No Food Insecurity (1/21/2025)    Hunger Vital Sign     Worried About Running Out of Food in the Last Year: Never true     Ran Out of Food in the Last Year: Never true   Transportation Needs: No  Transportation Needs (1/21/2025)    TRANSPORTATION NEEDS     Transportation : No   Physical Activity: Insufficiently Active (1/21/2025)    Exercise Vital Sign     Days of Exercise per Week: 2 days     Minutes of Exercise per Session: 30 min   Stress: No Stress Concern Present (1/21/2025)    [employer] - Occupational Stress Questionnaire     Feeling of Stress : Only a little   Housing Stability: Low Risk  (1/21/2025)    Housing Stability Vital Sign     Unable to Pay for Housing in the Last Year: No     Homeless in the Last Year: No       Plan:   ED navigator spoke with patient about ED visit. Patient states that he is doing better but he is still having back pain. Patient has home health therapy that started coming out two days a week to help with his back pain. Patient is waiting to be referred to a spine doctor to maybe find out what is going on with his back. Patient hasn't scheduled a post ED 7 day follow up with PCP. Patient declined assistance making a follow-up appointment with Meron Ayala at this time. Patient states that the clinic told him he didn't have to come back until three month follow up unless something comes up. Patient states that he is doing ok and doesn't need an appointment right now. Patient states that at times he needs help with transportation. ED navigator gave patient [employer] transportation number [phone]. ED navigator sent a link for patient to sign up for [facility] [employer]. Patient denies any new needs or needing any additional assistance at this time. ED Navigator gave [facility] 24/7 Nurse triage line ([phone] or 1-866-[facility] ([phone]) contact information, in addition to office contact information if further assistance is needed in the future. ED navigator will follow-up with patient on/around 2-10-25.    Karoline Claros ED Navigator   [phone]

## 2025-02-10 ENCOUNTER — PATIENT OUTREACH (OUTPATIENT)
Facility: OTHER | Age: 85
End: 2025-02-10
Payer: MEDICARE

## 2025-02-10 NOTE — PROGRESS NOTES
ED navigator contacted patient for a follow up. Patient states that he is still hurting in his back. Patient went to see the Spine MD last week. Patient got a X-ray done and that he has to go on 2-20-25 to get a MRI done. Patient is going to make an appointment with PCP to see if he can get pain medication. Patient states that nothing over the counter is helping with his pain. ED navigator ensured patient had no other needs at this time. Patient states that he didn't get the link to setup [employer]. ED navigator resent link. ED navigator will follow-up with patient on/around 3-7-25.    Karoline Claros ED Navigator   [phone]

## 2025-03-07 ENCOUNTER — PATIENT OUTREACH (OUTPATIENT)
Facility: OTHER | Age: 85
End: 2025-03-07
Payer: MEDICARE

## 2025-03-07 NOTE — PROGRESS NOTES
ED navigator contacted patient for a follow up. Patient states that he is doing much better with his back pain. Patient went to get a MRI done but hasn't gotten the results back yet. Patient has been doing home health therapy twice a week and thinks that it is helping with his pain. ED navigator ensured patient had no other needs at this time. ED navigator will close encounter at this time.    Karoline Claros ED Navigator   [phone]